# Patient Record
Sex: MALE | Race: BLACK OR AFRICAN AMERICAN | NOT HISPANIC OR LATINO | Employment: FULL TIME | URBAN - METROPOLITAN AREA
[De-identification: names, ages, dates, MRNs, and addresses within clinical notes are randomized per-mention and may not be internally consistent; named-entity substitution may affect disease eponyms.]

---

## 2017-06-16 ENCOUNTER — HOSPITAL ENCOUNTER (EMERGENCY)
Facility: MEDICAL CENTER | Age: 27
End: 2017-06-16
Attending: EMERGENCY MEDICINE
Payer: COMMERCIAL

## 2017-06-16 VITALS
SYSTOLIC BLOOD PRESSURE: 136 MMHG | HEIGHT: 65 IN | BODY MASS INDEX: 23.32 KG/M2 | HEART RATE: 80 BPM | RESPIRATION RATE: 13 BRPM | DIASTOLIC BLOOD PRESSURE: 60 MMHG | WEIGHT: 140 LBS | TEMPERATURE: 98.7 F | OXYGEN SATURATION: 100 %

## 2017-06-16 DIAGNOSIS — K52.9 GASTROENTERITIS: ICD-10-CM

## 2017-06-16 LAB
ANION GAP SERPL CALC-SCNC: 17 MMOL/L (ref 0–11.9)
ANION GAP SERPL CALC-SCNC: 8 MMOL/L (ref 0–11.9)
BUN SERPL-MCNC: 13 MG/DL (ref 8–22)
BUN SERPL-MCNC: 15 MG/DL (ref 8–22)
CALCIUM SERPL-MCNC: 10.6 MG/DL (ref 8.5–10.5)
CALCIUM SERPL-MCNC: 8.4 MG/DL (ref 8.5–10.5)
CHLORIDE SERPL-SCNC: 105 MMOL/L (ref 96–112)
CHLORIDE SERPL-SCNC: 114 MMOL/L (ref 96–112)
CO2 SERPL-SCNC: 18 MMOL/L (ref 20–33)
CO2 SERPL-SCNC: 20 MMOL/L (ref 20–33)
CREAT SERPL-MCNC: 0.92 MG/DL (ref 0.5–1.4)
CREAT SERPL-MCNC: 1.02 MG/DL (ref 0.5–1.4)
GFR SERPL CREATININE-BSD FRML MDRD: >60 ML/MIN/1.73 M 2
GFR SERPL CREATININE-BSD FRML MDRD: >60 ML/MIN/1.73 M 2
GLUCOSE SERPL-MCNC: 156 MG/DL (ref 65–99)
GLUCOSE SERPL-MCNC: 87 MG/DL (ref 65–99)
POTASSIUM SERPL-SCNC: 3.7 MMOL/L (ref 3.6–5.5)
POTASSIUM SERPL-SCNC: 4.3 MMOL/L (ref 3.6–5.5)
SODIUM SERPL-SCNC: 140 MMOL/L (ref 135–145)
SODIUM SERPL-SCNC: 142 MMOL/L (ref 135–145)

## 2017-06-16 PROCEDURE — 36415 COLL VENOUS BLD VENIPUNCTURE: CPT

## 2017-06-16 PROCEDURE — 700105 HCHG RX REV CODE 258: Performed by: EMERGENCY MEDICINE

## 2017-06-16 PROCEDURE — 700111 HCHG RX REV CODE 636 W/ 250 OVERRIDE (IP): Performed by: EMERGENCY MEDICINE

## 2017-06-16 PROCEDURE — 80048 BASIC METABOLIC PNL TOTAL CA: CPT

## 2017-06-16 PROCEDURE — 96374 THER/PROPH/DIAG INJ IV PUSH: CPT

## 2017-06-16 PROCEDURE — 96376 TX/PRO/DX INJ SAME DRUG ADON: CPT

## 2017-06-16 PROCEDURE — A9270 NON-COVERED ITEM OR SERVICE: HCPCS | Performed by: EMERGENCY MEDICINE

## 2017-06-16 PROCEDURE — 700102 HCHG RX REV CODE 250 W/ 637 OVERRIDE(OP): Performed by: EMERGENCY MEDICINE

## 2017-06-16 PROCEDURE — 96372 THER/PROPH/DIAG INJ SC/IM: CPT

## 2017-06-16 PROCEDURE — 99285 EMERGENCY DEPT VISIT HI MDM: CPT

## 2017-06-16 PROCEDURE — 96361 HYDRATE IV INFUSION ADD-ON: CPT

## 2017-06-16 RX ORDER — ONDANSETRON 4 MG/1
4 TABLET, ORALLY DISINTEGRATING ORAL EVERY 8 HOURS PRN
Qty: 10 TAB | Refills: 0 | Status: SHIPPED | OUTPATIENT
Start: 2017-06-16 | End: 2018-01-15

## 2017-06-16 RX ORDER — DICYCLOMINE HYDROCHLORIDE 10 MG/ML
20 INJECTION INTRAMUSCULAR ONCE
Status: COMPLETED | OUTPATIENT
Start: 2017-06-16 | End: 2017-06-16

## 2017-06-16 RX ORDER — LOPERAMIDE HYDROCHLORIDE 2 MG/1
2 CAPSULE ORAL ONCE
Status: COMPLETED | OUTPATIENT
Start: 2017-06-16 | End: 2017-06-16

## 2017-06-16 RX ORDER — LOPERAMIDE HYDROCHLORIDE 2 MG/1
2 CAPSULE ORAL 4 TIMES DAILY PRN
Qty: 12 CAP | Refills: 0 | Status: SHIPPED | OUTPATIENT
Start: 2017-06-16 | End: 2018-01-15

## 2017-06-16 RX ORDER — DICYCLOMINE HYDROCHLORIDE 10 MG/1
10 CAPSULE ORAL 3 TIMES DAILY PRN
Qty: 12 CAP | Refills: 0 | Status: SHIPPED | OUTPATIENT
Start: 2017-06-16 | End: 2018-01-15

## 2017-06-16 RX ORDER — SODIUM CHLORIDE 9 MG/ML
2000 INJECTION, SOLUTION INTRAVENOUS ONCE
Status: COMPLETED | OUTPATIENT
Start: 2017-06-16 | End: 2017-06-16

## 2017-06-16 RX ORDER — ONDANSETRON 2 MG/ML
4 INJECTION INTRAMUSCULAR; INTRAVENOUS ONCE
Status: COMPLETED | OUTPATIENT
Start: 2017-06-16 | End: 2017-06-16

## 2017-06-16 RX ADMIN — LOPERAMIDE HYDROCHLORIDE 2 MG: 2 CAPSULE ORAL at 18:35

## 2017-06-16 RX ADMIN — ONDANSETRON 4 MG: 2 INJECTION INTRAMUSCULAR; INTRAVENOUS at 18:18

## 2017-06-16 RX ADMIN — SODIUM CHLORIDE 2000 ML: 9 INJECTION, SOLUTION INTRAVENOUS at 18:18

## 2017-06-16 RX ADMIN — ONDANSETRON 4 MG: 2 INJECTION INTRAMUSCULAR; INTRAVENOUS at 20:30

## 2017-06-16 RX ADMIN — DICYCLOMINE HYDROCHLORIDE 20 MG: 20 INJECTION, SOLUTION INTRAMUSCULAR at 18:18

## 2017-06-16 ASSESSMENT — LIFESTYLE VARIABLES: DO YOU DRINK ALCOHOL: NO

## 2017-06-16 ASSESSMENT — PAIN SCALES - WONG BAKER: WONGBAKER_NUMERICALRESPONSE: HURTS A WHOLE LOT

## 2017-06-16 NOTE — ED AVS SNAPSHOT
RelayRides Access Code: [email or web address]  Expires: 7/16/2017  8:25 PM    Your email address is not on file at [facility].  Email Addresses are required for you to sign up for RelayRides, please contact [phone] to verify your personal information and to provide your email address prior to attempting to register for RelayRides.    Ethan Saunders Buzz  [address] [place] [address]    RelayRides  A secure, online tool to manage your health information     [facility]’s RelayRides® is a secure, online tool that connects you to your personalized health information from the privacy of your home -- day or night - making it very easy for you to manage your healthcare. Once the activation process is completed, you can even access your medical information using the RelayRides antwan, which is available for free in the Apple Antwan store or Google Play store.     To learn more about RelayRides, visit [email or web address]    There are two levels of access available (as shown below):   My Chart Features  [facility] Primary Care Doctor [facility]  Specialists [facility]  Urgent  Care Non-[facility] Primary Care Doctor   Email your healthcare team securely and privately 24/7 X X X    Manage appointments: schedule your next appointment; view details of past/upcoming appointments X      Request prescription refills. X      View recent personal medical records, including lab and immunizations X X X X   View health record, including health history, allergies, medications X X X X   Read reports about your outpatient visits, procedures, consult and ER notes X X X X   See your discharge summary, which is a recap of your hospital and/or ER visit that includes your diagnosis, lab results, and care plan X X  X     How to register for Comic Replyt:  Once your e-mail address has been verified, follow the following steps to sign up for Comic Replyt.     1. Go to  [email or web address]  2. Click on the Sign Up Now box, which takes you to the New Member Sign Up page. You  will need to provide the following information:  a. Enter your [employer] Access Code exactly as it appears at the top of this page. (You will not need to use this code after you’ve completed the sign-up process. If you do not sign up before the expiration date, you must request a new code.)   b. Enter your date of birth.   c. Enter your home email address.   d. Click Submit, and follow the next screen’s instructions.  3. Create a ithinksportt ID. This will be your [employer] login ID and cannot be changed, so think of one that is secure and easy to remember.  4. Create a [employer] password. You can change your password at any time.  5. Enter your Password Reset Question and Answer. This can be used at a later time if you forget your password.   6. Enter your e-mail address. This allows you to receive e-mail notifications when new information is available in [employer].  7. Click Sign Up. You can now view your health information.    For assistance activating your [employer] account, call [phone]

## 2017-06-16 NOTE — ED AVS SNAPSHOT
Home Care Instructions                                                                                                                Ethan Gerber   MRN: [id number]    Department:  [facility], Emergency Dept   Date of Visit:  6/16/2017            [facility], Emergency Dept    [address]    [place] [address]    Phone:  [phone]      You were seen by     Sunil Strickland M.D.      Your Diagnosis Was     Gastroenteritis     K52.9       These are the medications you received during your hospitalization from 06/16/2017 1742 to 06/16/2017 2025     Date/Time Order Dose Route Action    06/16/2017 1818 NS infusion 2,000 mL 2,000 mL Intravenous New Bag    06/16/2017 1818 ondansetron (ZOFRAN) syringe/vial injection 4 mg 4 mg Intravenous Given    06/16/2017 1818 dicyclomine (BENTYL) injection 20 mg 20 mg Intramuscular Given    06/16/2017 1835 loperamide (IMODIUM) capsule 2 mg 2 mg Oral Given      Follow-up Information     1. Follow up with Roya Royal M.D..    Specialty:  Internal Medicine    Why:  As needed    Contact information    [address]  [facility] [address] [phone]        Medication Information     Review all of your home medications and newly ordered medications with your primary doctor and/or pharmacist as soon as possible. Follow medication instructions as directed by your doctor and/or pharmacist.     Please keep your complete medication list with you and share with your physician. Update the information when medications are discontinued, doses are changed, or new medications (including over-the-counter products) are added; and carry medication information at all times in the event of emergency situations.               Medication List      START taking these medications        Instructions    Morning Afternoon Evening Bedtime    dicyclomine 10 MG Caps   Commonly known as:  BENTYL        Take 1 Cap by mouth 3 times a day as needed (abdominal  cramping).   Dose:  10 mg                        loperamide 2 MG Caps   Last time this was given:  2 mg on 6/16/2017  6:35 PM   Commonly known as:  IMODIUM        Take 1 Cap by mouth 4 times a day as needed for Diarrhea.   Dose:  2 mg                        ondansetron 4 MG Tbdp   Commonly known as:  ZOFRAN ODT        Take 1 Tab by mouth every 8 hours as needed for Nausea/Vomiting.   Dose:  4 mg                          ASK your doctor about these medications        Instructions    Morning Afternoon Evening Bedtime    cetirizine 10 MG Tabs   Commonly known as:  ZYRTEC        Take 10 mg by mouth every day.   Dose:  10 mg                             Where to Get Your Medications      You can get these medications from any pharmacy     Bring a paper prescription for each of these medications    - dicyclomine 10 MG Caps  - loperamide 2 MG Caps  - ondansetron 4 MG Tbdp            Procedures and tests performed during your visit     Procedure/Test Number of Times Performed    BASIC METABOLIC PANEL 2    ESTIMATED GFR 2        Discharge Instructions       Viral Gastroenteritis  Viral gastroenteritis is also known as stomach flu. This condition affects the stomach and intestinal tract. It can cause sudden diarrhea and vomiting. The illness typically lasts 3 to 8 days. Most people develop an immune response that eventually gets rid of the virus. While this natural response develops, the virus can make you quite ill.  CAUSES   Many different viruses can cause gastroenteritis, such as rotavirus or noroviruses. You can catch one of these viruses by consuming contaminated food or water. You may also catch a virus by sharing utensils or other personal items with an infected person or by touching a contaminated surface.  SYMPTOMS   The most common symptoms are diarrhea and vomiting. These problems can cause a severe loss of body fluids (dehydration) and a body salt (electrolyte) imbalance. Other symptoms may  include:  · Fever.  · Headache.  · Fatigue.  · Abdominal pain.  DIAGNOSIS   Your caregiver can usually diagnose viral gastroenteritis based on your symptoms and a physical exam. A stool sample may also be taken to test for the presence of viruses or other infections.  TREATMENT   This illness typically goes away on its own. Treatments are aimed at rehydration. The most serious cases of viral gastroenteritis involve vomiting so severely that you are not able to keep fluids down. In these cases, fluids must be given through an intravenous line (IV).  HOME CARE INSTRUCTIONS   · Drink enough fluids to keep your urine clear or pale yellow. Drink small amounts of fluids frequently and increase the amounts as tolerated.  · Ask your caregiver for specific rehydration instructions.  · Avoid:  ¨ Foods high in sugar.  ¨ Alcohol.  ¨ Carbonated drinks.  ¨ Tobacco.  ¨ Juice.  ¨ Caffeine drinks.  ¨ Extremely hot or cold fluids.  ¨ Fatty, greasy foods.  ¨ Too much intake of anything at one time.  ¨ Dairy products until 24 to 48 hours after diarrhea stops.  · You may consume probiotics. Probiotics are active cultures of beneficial bacteria. They may lessen the amount and number of diarrheal stools in adults. Probiotics can be found in yogurt with active cultures and in supplements.  · Wash your hands well to avoid spreading the virus.  · Only take over-the-counter or prescription medicines for pain, discomfort, or fever as directed by your caregiver. Do not give aspirin to children. Antidiarrheal medicines are not recommended.  · Ask your caregiver if you should continue to take your regular prescribed and over-the-counter medicines.  · Keep all follow-up appointments as directed by your caregiver.  SEEK IMMEDIATE MEDICAL CARE IF:   · You are unable to keep fluids down.  · You do not urinate at least once every 6 to 8 hours.  · You develop shortness of breath.  · You notice blood in your stool or vomit. This may look like coffee  grounds.  · You have abdominal pain that increases or is concentrated in one small area (localized).  · You have persistent vomiting or diarrhea.  · You have a fever.  · The patient is a child younger than 3 months, and he or she has a fever.  · The patient is a child older than 3 months, and he or she has a fever and persistent symptoms.  · The patient is a child older than 3 months, and he or she has a fever and symptoms suddenly get worse.  · The patient is a baby, and he or she has no tears when crying.  MAKE SURE YOU:   · Understand these instructions.  · Will watch your condition.  · Will get help right away if you are not doing well or get worse.     This information is not intended to replace advice given to you by your health care provider. Make sure you discuss any questions you have with your health care provider.     Document Released: 12/18/2006 Document Revised: 03/11/2013 Document Reviewed: 10/03/2012  [employer] Interactive Patient Education ©2016 [employer]            Patient Information     Patient Information    Following emergency treatment: all patient requiring follow-up care must return either to a private physician or a clinic if your condition worsens before you are able to obtain further medical attention, please return to the emergency room.     Billing Information    At [facility], we work to make the billing process streamlined for our patients.  Our Representatives are here to answer any questions you may have regarding your hospital bill.  If you have insurance coverage and have supplied your insurance information to us, we will submit a claim to your insurer on your behalf.  Should you have any questions regarding your bill, we can be reached online or by phone as follows:  Online: You are able pay your bills online or live chat with our representatives about any billing questions you may have. We are here to help Monday - Friday from 8:00am to 7:30pm and 9:00am - 12:00pm on  Saturdays.  Please visit [email or web address]  for more information.   Phone:  [phone] or [phone]    Please note that your emergency physician, surgeon, pathologist, radiologist, anesthesiologist, and other specialists are not employed by [facility] and will therefore bill separately for their services.  Please contact them directly for any questions concerning their bills at the numbers below:     Emergency Physician Services:  [phone]  [facility]:  [phone]  [facility]:  [phone]  [facility]:  [phone]    1. Your final bill may vary from the amount quoted upon discharge if all procedures are not complete at that time, or if your doctor has additional procedures of which we are not aware. You will receive an additional bill if you return to the Emergency Department at [facility] for suture removal regardless of the facility of which the sutures were placed.     2. Please arrange for settlement of this account at the emergency registration.    3. All self-pay accounts are due in full at the time of treatment.  If you are unable to meet this obligation then payment is expected within 4-5 days.     4. If you have had radiology studies (CT, X-ray, Ultrasound, MRI), you have received a preliminary result during your emergency department visit. Please contact the radiology department [phone] to receive a copy of your final result. Please discuss the Final result with your primary physician or with the follow up physician provided.     Crisis Hotline:  [place] Crisis Hotline:  7-489-ZZBICBM or 1-472.209.1983  [place] Crisis Hotline:    1-690.300.7024 or 651-733-3951         ED Discharge Follow Up Questions    1. In order to provide you with very good care, we would like to follow up with a phone call in the next few days.  May we have your permission to contact you?     YES /  NO    2. What is the best  phone number to call you? (       )_____-__________    3. What is the best time to call you?      Morning  /  Afternoon  /  Evening                   Patient Signature:  ____________________________________________________________    Date:  ____________________________________________________________

## 2017-06-16 NOTE — ED AVS SNAPSHOT
6/16/2017    Ethan Gerber  [address]  [place] [address]    Dear Ethan:    [facility] wants to ensure your discharge home is safe and you or your loved ones have had all of your questions answered regarding your care after you leave the hospital.    Below is a list of resources and contact information should you have any questions regarding your hospital stay, follow-up instructions, or active medical symptoms.    Questions or Concerns Regarding… Contact   Medical Questions Related to Your Discharge  (7 days a week, 8am-5pm) Contact a Nurse Care Coordinator   [phone]   Medical Questions Not Related to Your Discharge  (24 hours a day / 7 days a week)  Contact the Nurse Health Line   [phone]    Medications or Discharge Instructions Refer to your discharge packet   or contact your [facility] Primary Care Provider   [phone]   Follow-up Appointment(s) Schedule your appointment via [employer]   or contact Scheduling [phone]   Billing Review your statement via [employer]  or contact Billing [phone]   Medical Records Review your records via [employer]   or contact Medical Records [phone]     You may receive a telephone call within two days of discharge. This call is to make certain you understand your discharge instructions and have the opportunity to have any questions answered. You can also easily access your medical information, test results and upcoming appointments via the [employer] free online health management tool. You can learn more and sign up at [email or web address]. For assistance setting up your [employer] account, please call [phone].    Once again, we want to ensure your discharge home is safe and that you have a clear understanding of any next steps in your care. If you have any questions or concerns, please do not hesitate to contact us, we are here for you. Thank you for choosing [facility] for your healthcare needs.    Sincerely,    Your [facility] Healthcare Team

## 2017-06-17 NOTE — DISCHARGE INSTRUCTIONS
Viral Gastroenteritis  Viral gastroenteritis is also known as stomach flu. This condition affects the stomach and intestinal tract. It can cause sudden diarrhea and vomiting. The illness typically lasts 3 to 8 days. Most people develop an immune response that eventually gets rid of the virus. While this natural response develops, the virus can make you quite ill.  CAUSES   Many different viruses can cause gastroenteritis, such as rotavirus or noroviruses. You can catch one of these viruses by consuming contaminated food or water. You may also catch a virus by sharing utensils or other personal items with an infected person or by touching a contaminated surface.  SYMPTOMS   The most common symptoms are diarrhea and vomiting. These problems can cause a severe loss of body fluids (dehydration) and a body salt (electrolyte) imbalance. Other symptoms may include:  · Fever.  · Headache.  · Fatigue.  · Abdominal pain.  DIAGNOSIS   Your caregiver can usually diagnose viral gastroenteritis based on your symptoms and a physical exam. A stool sample may also be taken to test for the presence of viruses or other infections.  TREATMENT   This illness typically goes away on its own. Treatments are aimed at rehydration. The most serious cases of viral gastroenteritis involve vomiting so severely that you are not able to keep fluids down. In these cases, fluids must be given through an intravenous line (IV).  HOME CARE INSTRUCTIONS   · Drink enough fluids to keep your urine clear or pale yellow. Drink small amounts of fluids frequently and increase the amounts as tolerated.  · Ask your caregiver for specific rehydration instructions.  · Avoid:  ¨ Foods high in sugar.  ¨ Alcohol.  ¨ Carbonated drinks.  ¨ Tobacco.  ¨ Juice.  ¨ Caffeine drinks.  ¨ Extremely hot or cold fluids.  ¨ Fatty, greasy foods.  ¨ Too much intake of anything at one time.  ¨ Dairy products until 24 to 48 hours after diarrhea stops.  · You may consume probiotics.  Probiotics are active cultures of beneficial bacteria. They may lessen the amount and number of diarrheal stools in adults. Probiotics can be found in yogurt with active cultures and in supplements.  · Wash your hands well to avoid spreading the virus.  · Only take over-the-counter or prescription medicines for pain, discomfort, or fever as directed by your caregiver. Do not give aspirin to children. Antidiarrheal medicines are not recommended.  · Ask your caregiver if you should continue to take your regular prescribed and over-the-counter medicines.  · Keep all follow-up appointments as directed by your caregiver.  SEEK IMMEDIATE MEDICAL CARE IF:   · You are unable to keep fluids down.  · You do not urinate at least once every 6 to 8 hours.  · You develop shortness of breath.  · You notice blood in your stool or vomit. This may look like coffee grounds.  · You have abdominal pain that increases or is concentrated in one small area (localized).  · You have persistent vomiting or diarrhea.  · You have a fever.  · The patient is a child younger than 3 months, and he or she has a fever.  · The patient is a child older than 3 months, and he or she has a fever and persistent symptoms.  · The patient is a child older than 3 months, and he or she has a fever and symptoms suddenly get worse.  · The patient is a baby, and he or she has no tears when crying.  MAKE SURE YOU:   · Understand these instructions.  · Will watch your condition.  · Will get help right away if you are not doing well or get worse.     This information is not intended to replace advice given to you by your health care provider. Make sure you discuss any questions you have with your health care provider.     Document Released: 12/18/2006 Document Revised: 03/11/2013 Document Reviewed: 10/03/2012  [employer] Interactive Patient Education ©2016 [employer]

## 2017-06-17 NOTE — ED NOTES
Patient reports feeling much better after medication. Fluid bolus completed. Repeat BMP drawn and sent to lab.

## 2017-06-17 NOTE — ED NOTES
Chief Complaint   Patient presents with   • Nausea/Vomiting/Diarrhea     since this am   • Abdominal Pain     diffuse     BIB [facility] for above. VSS. FSBS 117 per medics. Given phenergen 12.5mg IV PTA. Chart up for ERP.

## 2017-06-17 NOTE — ED NOTES
Remedicated with zofran. Verbalizes understanding of discharge and followup instructions. PIVs removed. VSS. Given Rx's x3. Ambulates with steady gait to discharge with friends.

## 2017-06-17 NOTE — ED PROVIDER NOTES
"ED Provider Note    CHIEF COMPLAINT  Chief Complaint   Patient presents with   • Nausea/Vomiting/Diarrhea     since this am   • Abdominal Pain     diffuse       HPI  Ethan Gerber is a 27 y.o. male who presents to ED with nausea/vomiting/diarrhea. Onset ~0900 today. Unable to quantify # of vomiting episodes. No blood reported. Generalized abdominal cramping. Multiple watery stools w/o blood. +chills. No fevers. No urinary symptoms. Denies medical problems. No friends/family w/ similar symptoms. Phenergan per EMS. BS stable. No drug use.     REVIEW OF SYSTEMS  See HPI for further details. All other systems are negative.     PAST MEDICAL HISTORY   has a past medical history of Kidney stone.    SOCIAL HISTORY  Social History     Social History Main Topics   • Smoking status: Never Smoker    • Smokeless tobacco: Not on file   • Alcohol Use: No   • Drug Use: No   • Sexual Activity:     Partners: Female       SURGICAL HISTORY  patient denies any surgical history    CURRENT MEDICATIONS  Home Medications     Reviewed by Constantine Gillette R.N. (Registered Nurse) on 06/16/17 at 1754  Med List Status: Complete    Medication Last Dose Status    cetirizine (ZYRTEC) 10 MG Tab taking Active                ALLERGIES  No Known Allergies    PHYSICAL EXAM  VITAL SIGNS: /60 mmHg  Pulse 76  Temp(Src) 37.1 °C (98.7 °F)  Resp 14  Ht 1.651 m (5' 5\")  Wt 63.504 kg (140 lb)  BMI 23.30 kg/m2  SpO2 100% @REESE[[id number]::@   Pulse ox interpretation: I interpret this pulse ox as normal.  Constitutional: Alert in no apparent distress.  HENT: No signs of trauma, Bilateral external ears normal, Nose normal.   Eyes: Pupils are equal and reactive, Conjunctiva normal, Non-icteric.   Neck: Normal range of motion, No tenderness, Supple, No stridor. No JVD.  Lymphatic: No lymphadenopathy noted.   Cardiovascular: Regular rate and rhythm, no murmurs.   Thorax & Lungs: Normal breath sounds, No respiratory distress, No wheezing, No chest " tenderness.   Abdomen: Bowel sounds normal, Soft, Mild generalized abdominal tenderness w/o rebound tenderness or guarding, No pulsatile masses. No peritoneal signs.  Skin: Warm, Dry, No erythema, No rash.   Back: No bony tenderness, No CVA tenderness.   Extremities: Intact distal pulses, No edema, No tenderness,   Musculoskeletal: Good range of motion in all major joints. No tenderness to palpation or major deformities noted.   Neurologic: Alert , Normal motor function, Normal sensory function, No focal deficits noted.   Psychiatric: Affect normal, Judgment normal, Mood normal.       DIAGNOSTIC STUDIES / PROCEDURES     LABS  Results for orders placed or performed during the hospital encounter of 06/16/17   BASIC METABOLIC PANEL   Result Value Ref Range    Sodium 140 135 - 145 mmol/L    Potassium 4.3 3.6 - 5.5 mmol/L    Chloride 105 96 - 112 mmol/L    Co2 18 (L) 20 - 33 mmol/L    Glucose 156 (H) 65 - 99 mg/dL    Bun 15 8 - 22 mg/dL    Creatinine 1.02 0.50 - 1.40 mg/dL    Calcium 10.6 (H) 8.5 - 10.5 mg/dL    Anion Gap 17.0 (H) 0.0 - 11.9   ESTIMATED GFR   Result Value Ref Range    GFR If African American >60 >60 mL/min/1.73 m 2    GFR If Non African American >60 >60 mL/min/1.73 m 2   BASIC METABOLIC PANEL   Result Value Ref Range    Sodium 142 135 - 145 mmol/L    Potassium 3.7 3.6 - 5.5 mmol/L    Chloride 114 (H) 96 - 112 mmol/L    Co2 20 20 - 33 mmol/L    Glucose 87 65 - 99 mg/dL    Bun 13 8 - 22 mg/dL    Creatinine 0.92 0.50 - 1.40 mg/dL    Calcium 8.4 (L) 8.5 - 10.5 mg/dL    Anion Gap 8.0 0.0 - 11.9   ESTIMATED GFR   Result Value Ref Range    GFR If African American >60 >60 mL/min/1.73 m 2    GFR If Non African American >60 >60 mL/min/1.73 m 2       COURSE & MEDICAL DECISION MAKING  Pertinent Labs & Imaging studies reviewed. (See chart for details)  Afebrile. Non surgical abdomen. Healthy male. Symptoms c/w gastroenteritis. Initial BMP with AG acidosis. Complete resolution after 2 liters NS and patient very well  appearing tolerating PO. Continue nausea/diarrhea control and urge PO hydration. Left ED well appearing. Strict return instructions provided.     The patient will not drink alcohol nor drive with prescribed medications. The patient will return for worsening symptoms and is stable at the time of discharge. The patient verbalizes understanding and will comply.    FINAL IMPRESSION  1. Gastroenteritis        Electronically signed by: Sunil Strickland, 6/16/2017 6:00 PM

## 2018-01-15 ENCOUNTER — APPOINTMENT (OUTPATIENT)
Dept: RADIOLOGY | Facility: MEDICAL CENTER | Age: 28
End: 2018-01-15
Attending: EMERGENCY MEDICINE
Payer: COMMERCIAL

## 2018-01-15 ENCOUNTER — RESOLUTE PROFESSIONAL BILLING HOSPITAL PROF FEE (OUTPATIENT)
Dept: HOSPITALIST | Facility: MEDICAL CENTER | Age: 28
End: 2018-01-15
Payer: COMMERCIAL

## 2018-01-15 ENCOUNTER — HOSPITAL ENCOUNTER (OUTPATIENT)
Facility: MEDICAL CENTER | Age: 28
End: 2018-01-16
Attending: EMERGENCY MEDICINE | Admitting: INTERNAL MEDICINE
Payer: COMMERCIAL

## 2018-01-15 ENCOUNTER — APPOINTMENT (OUTPATIENT)
Dept: RADIOLOGY | Facility: MEDICAL CENTER | Age: 28
End: 2018-01-15
Attending: INTERNAL MEDICINE
Payer: COMMERCIAL

## 2018-01-15 DIAGNOSIS — R10.9 FLANK PAIN: ICD-10-CM

## 2018-01-15 PROBLEM — N23 RENAL COLIC: Status: ACTIVE | Noted: 2018-01-15

## 2018-01-15 LAB
ALBUMIN SERPL BCP-MCNC: 5 G/DL (ref 3.2–4.9)
ALBUMIN/GLOB SERPL: 1.7 G/DL
ALP SERPL-CCNC: 50 U/L (ref 30–99)
ALT SERPL-CCNC: 29 U/L (ref 2–50)
AMPHET UR QL SCN: NEGATIVE
ANION GAP SERPL CALC-SCNC: 16 MMOL/L (ref 0–11.9)
APPEARANCE UR: CLEAR
APPEARANCE UR: CLEAR
APTT PPP: 23.9 SEC (ref 24.7–36)
AST SERPL-CCNC: 20 U/L (ref 12–45)
BARBITURATES UR QL SCN: NEGATIVE
BASOPHILS # BLD AUTO: 0.6 % (ref 0–1.8)
BASOPHILS # BLD: 0.05 K/UL (ref 0–0.12)
BENZODIAZ UR QL SCN: NEGATIVE
BILIRUB SERPL-MCNC: 0.4 MG/DL (ref 0.1–1.5)
BILIRUB UR QL STRIP.AUTO: NEGATIVE
BILIRUB UR QL STRIP.AUTO: NEGATIVE
BUN SERPL-MCNC: 10 MG/DL (ref 8–22)
BZE UR QL SCN: NEGATIVE
C TRACH DNA SPEC QL NAA+PROBE: NEGATIVE
CALCIUM SERPL-MCNC: 10.2 MG/DL (ref 8.5–10.5)
CANNABINOIDS UR QL SCN: NEGATIVE
CHLORIDE SERPL-SCNC: 103 MMOL/L (ref 96–112)
CO2 SERPL-SCNC: 19 MMOL/L (ref 20–33)
COLOR UR: COLORLESS
COLOR UR: YELLOW
CORTIS SERPL-MCNC: 26.9 UG/DL (ref 0–23)
CREAT SERPL-MCNC: 1.05 MG/DL (ref 0.5–1.4)
CRP SERPL HS-MCNC: 0.16 MG/DL (ref 0–0.75)
EOSINOPHIL # BLD AUTO: 0.33 K/UL (ref 0–0.51)
EOSINOPHIL NFR BLD: 4 % (ref 0–6.9)
ERYTHROCYTE [DISTWIDTH] IN BLOOD BY AUTOMATED COUNT: 38.3 FL (ref 35.9–50)
ERYTHROCYTE [SEDIMENTATION RATE] IN BLOOD BY WESTERGREN METHOD: 0 MM/HOUR (ref 0–15)
GLOBULIN SER CALC-MCNC: 3 G/DL (ref 1.9–3.5)
GLUCOSE BLD-MCNC: 90 MG/DL (ref 65–99)
GLUCOSE SERPL-MCNC: 109 MG/DL (ref 65–99)
GLUCOSE UR STRIP.AUTO-MCNC: NEGATIVE MG/DL
GLUCOSE UR STRIP.AUTO-MCNC: NEGATIVE MG/DL
HCT VFR BLD AUTO: 46.3 % (ref 42–52)
HGB BLD-MCNC: 15.6 G/DL (ref 14–18)
IMM GRANULOCYTES # BLD AUTO: 0.04 K/UL (ref 0–0.11)
IMM GRANULOCYTES NFR BLD AUTO: 0.5 % (ref 0–0.9)
INR PPP: 1.02 (ref 0.87–1.13)
KETONES UR STRIP.AUTO-MCNC: ABNORMAL MG/DL
KETONES UR STRIP.AUTO-MCNC: ABNORMAL MG/DL
LACTATE BLD-SCNC: 1.8 MMOL/L (ref 0.5–2)
LACTATE BLD-SCNC: 1.9 MMOL/L (ref 0.5–2)
LACTATE BLD-SCNC: 3.6 MMOL/L (ref 0.5–2)
LEUKOCYTE ESTERASE UR QL STRIP.AUTO: NEGATIVE
LEUKOCYTE ESTERASE UR QL STRIP.AUTO: NEGATIVE
LIPASE SERPL-CCNC: 11 U/L (ref 11–82)
LYMPHOCYTES # BLD AUTO: 4.01 K/UL (ref 1–4.8)
LYMPHOCYTES NFR BLD: 49 % (ref 22–41)
MAGNESIUM SERPL-MCNC: 1.6 MG/DL (ref 1.5–2.5)
MCH RBC QN AUTO: 27.7 PG (ref 27–33)
MCHC RBC AUTO-ENTMCNC: 33.7 G/DL (ref 33.7–35.3)
MCV RBC AUTO: 82.2 FL (ref 81.4–97.8)
METHADONE UR QL SCN: NEGATIVE
MICRO URNS: ABNORMAL
MICRO URNS: ABNORMAL
MONOCYTES # BLD AUTO: 0.84 K/UL (ref 0–0.85)
MONOCYTES NFR BLD AUTO: 10.3 % (ref 0–13.4)
N GONORRHOEA DNA SPEC QL NAA+PROBE: NEGATIVE
NEUTROPHILS # BLD AUTO: 2.92 K/UL (ref 1.82–7.42)
NEUTROPHILS NFR BLD: 35.6 % (ref 44–72)
NITRITE UR QL STRIP.AUTO: NEGATIVE
NITRITE UR QL STRIP.AUTO: NEGATIVE
NRBC # BLD AUTO: 0 K/UL
NRBC BLD-RTO: 0 /100 WBC
OPIATES UR QL SCN: POSITIVE
OXYCODONE UR QL SCN: POSITIVE
PCP UR QL SCN: NEGATIVE
PH UR STRIP.AUTO: 8 [PH]
PH UR STRIP.AUTO: >=9 [PH]
PHOSPHATE SERPL-MCNC: 1.8 MG/DL (ref 2.5–4.5)
PLATELET # BLD AUTO: 272 K/UL (ref 164–446)
PMV BLD AUTO: 10.8 FL (ref 9–12.9)
POTASSIUM SERPL-SCNC: 3.4 MMOL/L (ref 3.6–5.5)
PROCALCITONIN SERPL-MCNC: <0.05 NG/ML
PROPOXYPH UR QL SCN: NEGATIVE
PROT SERPL-MCNC: 8 G/DL (ref 6–8.2)
PROT UR QL STRIP: NEGATIVE MG/DL
PROT UR QL STRIP: NEGATIVE MG/DL
PROTHROMBIN TIME: 13.1 SEC (ref 12–14.6)
RBC # BLD AUTO: 5.63 M/UL (ref 4.7–6.1)
RBC UR QL AUTO: NEGATIVE
RBC UR QL AUTO: NEGATIVE
SODIUM SERPL-SCNC: 138 MMOL/L (ref 135–145)
SP GR UR STRIP.AUTO: 1.01
SP GR UR STRIP.AUTO: 1.02
SPECIMEN SOURCE: NORMAL
TROPONIN I SERPL-MCNC: <0.01 NG/ML (ref 0–0.04)
TSH SERPL DL<=0.005 MIU/L-ACNC: 2.09 UIU/ML (ref 0.38–5.33)
UROBILINOGEN UR STRIP.AUTO-MCNC: 0.2 MG/DL
UROBILINOGEN UR STRIP.AUTO-MCNC: NORMAL MG/DL
WBC # BLD AUTO: 8.2 K/UL (ref 4.8–10.8)

## 2018-01-15 PROCEDURE — 96367 TX/PROPH/DG ADDL SEQ IV INF: CPT

## 2018-01-15 PROCEDURE — 96366 THER/PROPH/DIAG IV INF ADDON: CPT

## 2018-01-15 PROCEDURE — 85025 COMPLETE CBC W/AUTO DIFF WBC: CPT

## 2018-01-15 PROCEDURE — 51702 INSERT TEMP BLADDER CATH: CPT

## 2018-01-15 PROCEDURE — 78708 K FLOW/FUNCT IMAGE W/DRUG: CPT

## 2018-01-15 PROCEDURE — 85610 PROTHROMBIN TIME: CPT

## 2018-01-15 PROCEDURE — 84100 ASSAY OF PHOSPHORUS: CPT

## 2018-01-15 PROCEDURE — 99285 EMERGENCY DEPT VISIT HI MDM: CPT

## 2018-01-15 PROCEDURE — 76870 US EXAM SCROTUM: CPT

## 2018-01-15 PROCEDURE — G0378 HOSPITAL OBSERVATION PER HR: HCPCS

## 2018-01-15 PROCEDURE — 83735 ASSAY OF MAGNESIUM: CPT

## 2018-01-15 PROCEDURE — 81003 URINALYSIS AUTO W/O SCOPE: CPT

## 2018-01-15 PROCEDURE — 700102 HCHG RX REV CODE 250 W/ 637 OVERRIDE(OP): Performed by: INTERNAL MEDICINE

## 2018-01-15 PROCEDURE — 93005 ELECTROCARDIOGRAM TRACING: CPT | Performed by: INTERNAL MEDICINE

## 2018-01-15 PROCEDURE — 700105 HCHG RX REV CODE 258: Performed by: EMERGENCY MEDICINE

## 2018-01-15 PROCEDURE — 87491 CHLMYD TRACH DNA AMP PROBE: CPT

## 2018-01-15 PROCEDURE — 82962 GLUCOSE BLOOD TEST: CPT

## 2018-01-15 PROCEDURE — 700105 HCHG RX REV CODE 258: Performed by: INTERNAL MEDICINE

## 2018-01-15 PROCEDURE — 85652 RBC SED RATE AUTOMATED: CPT

## 2018-01-15 PROCEDURE — 80307 DRUG TEST PRSMV CHEM ANLYZR: CPT

## 2018-01-15 PROCEDURE — 86140 C-REACTIVE PROTEIN: CPT

## 2018-01-15 PROCEDURE — 96365 THER/PROPH/DIAG IV INF INIT: CPT

## 2018-01-15 PROCEDURE — 303105 HCHG CATHETER EXTRA

## 2018-01-15 PROCEDURE — 85730 THROMBOPLASTIN TIME PARTIAL: CPT

## 2018-01-15 PROCEDURE — 99220 PR INITIAL OBSERVATION CARE,LEVL III: CPT | Performed by: INTERNAL MEDICINE

## 2018-01-15 PROCEDURE — 700111 HCHG RX REV CODE 636 W/ 250 OVERRIDE (IP): Performed by: INTERNAL MEDICINE

## 2018-01-15 PROCEDURE — A9270 NON-COVERED ITEM OR SERVICE: HCPCS | Performed by: INTERNAL MEDICINE

## 2018-01-15 PROCEDURE — 700111 HCHG RX REV CODE 636 W/ 250 OVERRIDE (IP): Performed by: EMERGENCY MEDICINE

## 2018-01-15 PROCEDURE — 87086 URINE CULTURE/COLONY COUNT: CPT

## 2018-01-15 PROCEDURE — 96368 THER/DIAG CONCURRENT INF: CPT

## 2018-01-15 PROCEDURE — 36415 COLL VENOUS BLD VENIPUNCTURE: CPT

## 2018-01-15 PROCEDURE — 83605 ASSAY OF LACTIC ACID: CPT

## 2018-01-15 PROCEDURE — 83690 ASSAY OF LIPASE: CPT

## 2018-01-15 PROCEDURE — 87591 N.GONORRHOEAE DNA AMP PROB: CPT

## 2018-01-15 PROCEDURE — 96376 TX/PRO/DX INJ SAME DRUG ADON: CPT

## 2018-01-15 PROCEDURE — 700111 HCHG RX REV CODE 636 W/ 250 OVERRIDE (IP)

## 2018-01-15 PROCEDURE — 87040 BLOOD CULTURE FOR BACTERIA: CPT

## 2018-01-15 PROCEDURE — 700101 HCHG RX REV CODE 250: Performed by: INTERNAL MEDICINE

## 2018-01-15 PROCEDURE — 84443 ASSAY THYROID STIM HORMONE: CPT

## 2018-01-15 PROCEDURE — 82533 TOTAL CORTISOL: CPT

## 2018-01-15 PROCEDURE — 74176 CT ABD & PELVIS W/O CONTRAST: CPT

## 2018-01-15 PROCEDURE — 84145 PROCALCITONIN (PCT): CPT

## 2018-01-15 PROCEDURE — 80053 COMPREHEN METABOLIC PANEL: CPT

## 2018-01-15 PROCEDURE — 94760 N-INVAS EAR/PLS OXIMETRY 1: CPT

## 2018-01-15 PROCEDURE — 96375 TX/PRO/DX INJ NEW DRUG ADDON: CPT

## 2018-01-15 PROCEDURE — 84484 ASSAY OF TROPONIN QUANT: CPT

## 2018-01-15 RX ORDER — SODIUM CHLORIDE 9 MG/ML
1000 INJECTION, SOLUTION INTRAVENOUS ONCE
Status: COMPLETED | OUTPATIENT
Start: 2018-01-15 | End: 2018-01-15

## 2018-01-15 RX ORDER — HYDROMORPHONE HYDROCHLORIDE 2 MG/ML
INJECTION, SOLUTION INTRAMUSCULAR; INTRAVENOUS; SUBCUTANEOUS
Status: COMPLETED
Start: 2018-01-15 | End: 2018-01-15

## 2018-01-15 RX ORDER — OXYCODONE HYDROCHLORIDE 5 MG/1
5 TABLET ORAL
Status: DISCONTINUED | OUTPATIENT
Start: 2018-01-15 | End: 2018-01-16 | Stop reason: HOSPADM

## 2018-01-15 RX ORDER — SODIUM CHLORIDE 9 MG/ML
500 INJECTION, SOLUTION INTRAVENOUS
Status: DISCONTINUED | OUTPATIENT
Start: 2018-01-15 | End: 2018-01-16 | Stop reason: HOSPADM

## 2018-01-15 RX ORDER — HYDROMORPHONE HYDROCHLORIDE 2 MG/ML
1 INJECTION, SOLUTION INTRAMUSCULAR; INTRAVENOUS; SUBCUTANEOUS ONCE
Status: COMPLETED | OUTPATIENT
Start: 2018-01-15 | End: 2018-01-15

## 2018-01-15 RX ORDER — ACETAMINOPHEN 325 MG/1
650 TABLET ORAL EVERY 6 HOURS PRN
Status: DISCONTINUED | OUTPATIENT
Start: 2018-01-15 | End: 2018-01-16 | Stop reason: HOSPADM

## 2018-01-15 RX ORDER — LABETALOL HYDROCHLORIDE 5 MG/ML
10 INJECTION, SOLUTION INTRAVENOUS EVERY 4 HOURS PRN
Status: DISCONTINUED | OUTPATIENT
Start: 2018-01-15 | End: 2018-01-16 | Stop reason: HOSPADM

## 2018-01-15 RX ORDER — AMOXICILLIN 250 MG
2 CAPSULE ORAL 2 TIMES DAILY
Status: DISCONTINUED | OUTPATIENT
Start: 2018-01-15 | End: 2018-01-16 | Stop reason: HOSPADM

## 2018-01-15 RX ORDER — PROMETHAZINE HYDROCHLORIDE 25 MG/1
12.5-25 SUPPOSITORY RECTAL EVERY 4 HOURS PRN
Status: DISCONTINUED | OUTPATIENT
Start: 2018-01-15 | End: 2018-01-16 | Stop reason: HOSPADM

## 2018-01-15 RX ORDER — POTASSIUM CHLORIDE 7.45 MG/ML
10 INJECTION INTRAVENOUS
Status: COMPLETED | OUTPATIENT
Start: 2018-01-15 | End: 2018-01-15

## 2018-01-15 RX ORDER — PROMETHAZINE HYDROCHLORIDE 25 MG/1
12.5-25 TABLET ORAL EVERY 4 HOURS PRN
Status: DISCONTINUED | OUTPATIENT
Start: 2018-01-15 | End: 2018-01-16 | Stop reason: HOSPADM

## 2018-01-15 RX ORDER — METOCLOPRAMIDE HYDROCHLORIDE 5 MG/ML
10 INJECTION INTRAMUSCULAR; INTRAVENOUS EVERY 6 HOURS PRN
Status: DISCONTINUED | OUTPATIENT
Start: 2018-01-15 | End: 2018-01-15

## 2018-01-15 RX ORDER — ONDANSETRON 2 MG/ML
4 INJECTION INTRAMUSCULAR; INTRAVENOUS ONCE
Status: COMPLETED | OUTPATIENT
Start: 2018-01-15 | End: 2018-01-15

## 2018-01-15 RX ORDER — FUROSEMIDE 10 MG/ML
INJECTION INTRAMUSCULAR; INTRAVENOUS
Status: COMPLETED
Start: 2018-01-15 | End: 2018-01-15

## 2018-01-15 RX ORDER — TAMSULOSIN HYDROCHLORIDE 0.4 MG/1
0.4 CAPSULE ORAL
Status: DISCONTINUED | OUTPATIENT
Start: 2018-01-15 | End: 2018-01-16 | Stop reason: HOSPADM

## 2018-01-15 RX ORDER — POLYETHYLENE GLYCOL 3350 17 G/17G
1 POWDER, FOR SOLUTION ORAL
Status: DISCONTINUED | OUTPATIENT
Start: 2018-01-15 | End: 2018-01-16 | Stop reason: HOSPADM

## 2018-01-15 RX ORDER — ONDANSETRON 4 MG/1
4 TABLET, ORALLY DISINTEGRATING ORAL EVERY 4 HOURS PRN
Status: DISCONTINUED | OUTPATIENT
Start: 2018-01-15 | End: 2018-01-16 | Stop reason: HOSPADM

## 2018-01-15 RX ORDER — MAGNESIUM SULFATE HEPTAHYDRATE 40 MG/ML
2 INJECTION, SOLUTION INTRAVENOUS ONCE
Status: COMPLETED | OUTPATIENT
Start: 2018-01-15 | End: 2018-01-15

## 2018-01-15 RX ORDER — M-VIT,TX,IRON,MINS/CALC/FOLIC 27MG-0.4MG
1 TABLET ORAL DAILY
COMMUNITY
End: 2019-01-09

## 2018-01-15 RX ORDER — MORPHINE SULFATE 4 MG/ML
4 INJECTION, SOLUTION INTRAMUSCULAR; INTRAVENOUS ONCE
Status: COMPLETED | OUTPATIENT
Start: 2018-01-15 | End: 2018-01-15

## 2018-01-15 RX ORDER — DEXTROSE MONOHYDRATE, SODIUM CHLORIDE, AND POTASSIUM CHLORIDE 50; 2.98; 4.5 G/1000ML; G/1000ML; G/1000ML
INJECTION, SOLUTION INTRAVENOUS CONTINUOUS
Status: DISCONTINUED | OUTPATIENT
Start: 2018-01-15 | End: 2018-01-16

## 2018-01-15 RX ORDER — KETOROLAC TROMETHAMINE 30 MG/ML
30 INJECTION, SOLUTION INTRAMUSCULAR; INTRAVENOUS ONCE
Status: COMPLETED | OUTPATIENT
Start: 2018-01-15 | End: 2018-01-15

## 2018-01-15 RX ORDER — ONDANSETRON 2 MG/ML
4-8 INJECTION INTRAMUSCULAR; INTRAVENOUS EVERY 4 HOURS PRN
Status: DISCONTINUED | OUTPATIENT
Start: 2018-01-15 | End: 2018-01-15

## 2018-01-15 RX ORDER — SODIUM CHLORIDE 9 MG/ML
1500 INJECTION, SOLUTION INTRAVENOUS ONCE
Status: COMPLETED | OUTPATIENT
Start: 2018-01-15 | End: 2018-01-15

## 2018-01-15 RX ORDER — BISACODYL 10 MG
10 SUPPOSITORY, RECTAL RECTAL
Status: DISCONTINUED | OUTPATIENT
Start: 2018-01-15 | End: 2018-01-16 | Stop reason: HOSPADM

## 2018-01-15 RX ORDER — HYDROMORPHONE HYDROCHLORIDE 2 MG/ML
1 INJECTION, SOLUTION INTRAMUSCULAR; INTRAVENOUS; SUBCUTANEOUS EVERY 4 HOURS PRN
Status: DISCONTINUED | OUTPATIENT
Start: 2018-01-15 | End: 2018-01-15

## 2018-01-15 RX ORDER — OXYCODONE HYDROCHLORIDE 5 MG/1
10 TABLET ORAL
Status: DISCONTINUED | OUTPATIENT
Start: 2018-01-15 | End: 2018-01-16 | Stop reason: HOSPADM

## 2018-01-15 RX ORDER — HYDROMORPHONE HYDROCHLORIDE 2 MG/ML
0.5 INJECTION, SOLUTION INTRAMUSCULAR; INTRAVENOUS; SUBCUTANEOUS
Status: DISCONTINUED | OUTPATIENT
Start: 2018-01-15 | End: 2018-01-16 | Stop reason: HOSPADM

## 2018-01-15 RX ORDER — ONDANSETRON 2 MG/ML
4 INJECTION INTRAMUSCULAR; INTRAVENOUS EVERY 4 HOURS PRN
Status: DISCONTINUED | OUTPATIENT
Start: 2018-01-15 | End: 2018-01-16 | Stop reason: HOSPADM

## 2018-01-15 RX ADMIN — ONDANSETRON 4 MG: 2 INJECTION INTRAMUSCULAR; INTRAVENOUS at 19:27

## 2018-01-15 RX ADMIN — ONDANSETRON 4 MG: 4 TABLET, ORALLY DISINTEGRATING ORAL at 13:54

## 2018-01-15 RX ADMIN — CEFTRIAXONE 2 G: 2 INJECTION, POWDER, FOR SOLUTION INTRAMUSCULAR; INTRAVENOUS at 12:52

## 2018-01-15 RX ADMIN — ONDANSETRON 4 MG: 2 INJECTION INTRAMUSCULAR; INTRAVENOUS at 12:44

## 2018-01-15 RX ADMIN — POTASSIUM PHOSPHATE, MONOBASIC AND POTASSIUM PHOSPHATE, DIBASIC 30 MMOL: 224; 236 INJECTION, SOLUTION INTRAVENOUS at 19:26

## 2018-01-15 RX ADMIN — SODIUM CHLORIDE 1500 ML: 9 INJECTION, SOLUTION INTRAVENOUS at 11:45

## 2018-01-15 RX ADMIN — ONDANSETRON 4 MG: 2 INJECTION INTRAMUSCULAR; INTRAVENOUS at 06:28

## 2018-01-15 RX ADMIN — MAGNESIUM SULFATE IN WATER 2 G: 40 INJECTION, SOLUTION INTRAVENOUS at 16:59

## 2018-01-15 RX ADMIN — POTASSIUM CHLORIDE 10 MEQ: 7.46 INJECTION, SOLUTION INTRAVENOUS at 12:52

## 2018-01-15 RX ADMIN — SODIUM CHLORIDE 1000 ML: 9 INJECTION, SOLUTION INTRAVENOUS at 10:59

## 2018-01-15 RX ADMIN — HYDROMORPHONE HYDROCHLORIDE 1 MG: 2 INJECTION INTRAMUSCULAR; INTRAVENOUS; SUBCUTANEOUS at 09:41

## 2018-01-15 RX ADMIN — HYDROMORPHONE HYDROCHLORIDE 1 MG: 2 INJECTION INTRAMUSCULAR; INTRAVENOUS; SUBCUTANEOUS at 07:12

## 2018-01-15 RX ADMIN — HYDROMORPHONE HYDROCHLORIDE 1 MG: 2 INJECTION INTRAMUSCULAR; INTRAVENOUS; SUBCUTANEOUS at 12:45

## 2018-01-15 RX ADMIN — TAMSULOSIN HYDROCHLORIDE 0.4 MG: 0.4 CAPSULE ORAL at 12:00

## 2018-01-15 RX ADMIN — ONDANSETRON 4 MG: 2 INJECTION INTRAMUSCULAR; INTRAVENOUS at 10:59

## 2018-01-15 RX ADMIN — HYDROMORPHONE HYDROCHLORIDE 1 MG: 2 INJECTION INTRAMUSCULAR; INTRAVENOUS; SUBCUTANEOUS at 13:53

## 2018-01-15 RX ADMIN — POTASSIUM CHLORIDE, DEXTROSE MONOHYDRATE AND SODIUM CHLORIDE: 300; 5; 450 INJECTION, SOLUTION INTRAVENOUS at 16:59

## 2018-01-15 RX ADMIN — MORPHINE SULFATE 4 MG: 4 INJECTION INTRAVENOUS at 06:28

## 2018-01-15 RX ADMIN — KETOROLAC TROMETHAMINE 30 MG: 30 INJECTION, SOLUTION INTRAMUSCULAR at 06:34

## 2018-01-15 RX ADMIN — POTASSIUM CHLORIDE 10 MEQ: 7.46 INJECTION, SOLUTION INTRAVENOUS at 12:03

## 2018-01-15 RX ADMIN — PROCHLORPERAZINE EDISYLATE 10 MG: 5 INJECTION INTRAMUSCULAR; INTRAVENOUS at 11:58

## 2018-01-15 RX ADMIN — FUROSEMIDE 20 MG: 10 INJECTION, SOLUTION INTRAMUSCULAR; INTRAVENOUS at 15:15

## 2018-01-15 ASSESSMENT — ENCOUNTER SYMPTOMS
HEADACHES: 0
SEIZURES: 0
SENSORY CHANGE: 0
ABDOMINAL PAIN: 1
BLURRED VISION: 0
SPUTUM PRODUCTION: 0
NECK PAIN: 0
NAUSEA: 1
CLAUDICATION: 0
DEPRESSION: 0
HEARTBURN: 0
WHEEZING: 0
DIARRHEA: 0
ORTHOPNEA: 0
TREMORS: 0
MYALGIAS: 0
COUGH: 0
FOCAL WEAKNESS: 0
DOUBLE VISION: 0
CHILLS: 1
FALLS: 0
SHORTNESS OF BREATH: 0
LOSS OF CONSCIOUSNESS: 0
PALPITATIONS: 0
TINGLING: 0
WEAKNESS: 1
SPEECH CHANGE: 0
DIZZINESS: 0
PND: 0
CONSTIPATION: 0
BLOOD IN STOOL: 0
FEVER: 0
FLANK PAIN: 1
HEMOPTYSIS: 0
BACK PAIN: 0
DIAPHORESIS: 1
VOMITING: 1

## 2018-01-15 ASSESSMENT — LIFESTYLE VARIABLES
ALCOHOL_USE: NO
EVER_SMOKED: NEVER
EVER_SMOKED: NEVER
DO YOU DRINK ALCOHOL: NO

## 2018-01-15 ASSESSMENT — PAIN SCALES - GENERAL
PAINLEVEL_OUTOF10: 3
PAINLEVEL_OUTOF10: 0
PAINLEVEL_OUTOF10: 3
PAINLEVEL_OUTOF10: 8
PAINLEVEL_OUTOF10: 10
PAINLEVEL_OUTOF10: 10

## 2018-01-15 ASSESSMENT — PATIENT HEALTH QUESTIONNAIRE - PHQ9
SUM OF ALL RESPONSES TO PHQ QUESTIONS 1-9: 0
1. LITTLE INTEREST OR PLEASURE IN DOING THINGS: NOT AT ALL
2. FEELING DOWN, DEPRESSED, IRRITABLE, OR HOPELESS: NOT AT ALL
SUM OF ALL RESPONSES TO PHQ9 QUESTIONS 1 AND 2: 0

## 2018-01-15 ASSESSMENT — COPD QUESTIONNAIRES
DO YOU EVER COUGH UP ANY MUCUS OR PHLEGM?: NO/ONLY WITH OCCASIONAL COLDS OR INFECTIONS
DURING THE PAST 4 WEEKS HOW MUCH DID YOU FEEL SHORT OF BREATH: NONE/LITTLE OF THE TIME
HAVE YOU SMOKED AT LEAST 100 CIGARETTES IN YOUR ENTIRE LIFE: NO/DON'T KNOW

## 2018-01-15 NOTE — PROGRESS NOTES
Report received, assumed patient care.  Pt A&OX4.  Family at bedside.  Assessment completed.  Call light within reach, personal belongings available, bed in lowest position, pt calling for assistance.  Pt reports pain to tip of penis, he believes it is d/t the feng catheter, wants it removed.  Dr. Bui needs the feng in until the nucmed study is complete (study will be done at 1420).  Pt aware and agreeable to keep feng in until study is complete.  Offered ice and pain medication.  Pt refused ice however is agreeable to pain meds, see MAR.  +n, +v, see MAR.  Pt is NPO.  Communication board updated, POC discussed.  VSS.  No additional needs at this time.

## 2018-01-15 NOTE — ED NOTES
Pt. Unable to urinate and feels bladder pressure... Dueñas Catheter inserted.... Pt. Tolerates well.... aao x 4

## 2018-01-15 NOTE — ED NOTES
"Med rec updated and complete  Allergies reviewed  Pt states \"No antibiotics in the last 30 days\".  Pt states \"No prescription medications\".    "

## 2018-01-15 NOTE — ED PROVIDER NOTES
"ED Provider Note    CHIEF COMPLAINT  Chief Complaint   Patient presents with   • Flank Pain     L side; radiates to genital area       HPI  Ethan Gerber is a 27 y.o. male who presents with left flank pain, return to the yesterday, much worse this morning. Urinary urgency last night, nausea. Then this morning, sudden onset severe left flank pain radiating to his left testicle and left lower quadrant. Nausea without vomiting. No severe sudden onset of colicky pain no fever no chills no hematuria. History of kidney stones with similar symptoms    REVIEW OF SYSTEMS  See HPI for further details history of kidney stones. Denies other G.I., G.U.. endrocine, cardiovascular, respriatory or neurological problems. All other systems are negative.     PAST MEDICAL HISTORY  Past Medical History:   Diagnosis Date   • Kidney stone        FAMILY HISTORY  Family History   Problem Relation Age of Onset   • Cancer Mother 44     lung cancer/ smoker       SOCIAL HISTORY he is a nonsmoker  Social History     Social History   • Marital status:      Spouse name: N/A   • Number of children: N/A   • Years of education: N/A     Social History Main Topics   • Smoking status: Never Smoker   • Smokeless tobacco: Not on file   • Alcohol use No   • Drug use: No   • Sexual activity: Yes     Partners: Female     Other Topics Concern   • Not on file     Social History Narrative   • No narrative on file       SURGICAL HISTORY  No past surgical history on file.    CURRENT MEDICATIONS  Home Medications    **Home medications have not yet been reviewed for this encounter**         ALLERGIES  No Known Allergies    PHYSICAL EXAM  VITAL SIGNS: /80   Pulse (!) 104   Temp 36.7 °C (98 °F)   Resp (!) 24   Ht 1.727 m (5' 8\")   Wt 80.5 kg (177 lb 7.5 oz)   SpO2 100%   BMI 26.98 kg/m²   Constitutional: Well developed, Well nourished, appears to be in acute pain  HENT: Normocephalic, Atraumatic, Bilateral external ears normal, Oropharynx " moist, No oral exudates, Nose normal.   Eyes: PERRL, EOMI, Conjunctiva normal, No discharge.   Neck: Normal range of motion, No tenderness, Supple, No stridor.   Lymphatic: No lymphadenopathy noted.   Cardiovascular: Normal heart rate, Normal rhythm, No murmurs, No rubs, No gallops.   Thorax & Lungs: Normal breath sounds, No respiratory distress, No wheezing, No chest tenderness.   Abdomen:  No tenderness, no guarding no rigidity and the abdomen is soft.  No masses, No pulsatile masses.  Skin: Warm, Dry, No erythema, No rash.   Back: No tenderness, No CVA tenderness.   Extremities: Intact distal pulses, No edema, No tenderness, No cyanosis, No clubbing.   Musculoskeletal: Good range of motion in all major joints. No tenderness to palpation or major deformities noted.   Neurologic: Alert & oriented x 3, Normal motor function, Normal sensory function, No focal deficits noted.   Psychiatric: Affect normal, Judgment normal, Mood normal. Siaety about his pain  Penis and scrotum, nontender. Testicle nontender    RADIOLOGY/PROCEDURES  CT-RENAL COLIC EVALUATION(A/P W/O)   Final Result         1.  No hydronephrosis or nephrolithiasis.      2.  Minimal dilatation of the proximal left ureter. No left ureteral stone identified.      3.  No distal ureteral stone identified. Multiple left-sided pelvic phleboliths are present      4.  Normal appearance of the appendix.            COURSE & MEDICAL DECISION MAKING  Pertinent Labs & Imaging studies reviewed. (See chart for details)  Urinalysis, negative for blood negative for infection      He has sudden onset of flank pain, history of kidney stones, given Toradol morphine and Zofran CAT scan demonstrates minimal dilation of the proximal left ureter.. There was no obstruction was identified.  . I have talked with the on-call urologist Dr. Vanessa recommended a magnetic 3 renal scan with Lasix. That has been offered. Despite the paucity of findings on imaging he is still having a great  deal of pain. I have talked with the hospitalist about admission  FINAL IMPRESSION  1.   1. Flank pain        2.   3.     Disposition  Hospitalist to admit, Dr. Amrit Vanessa, neurology consult  Electronically signed by: Kael Ricardo, 1/15/2018 6:26 AM

## 2018-01-15 NOTE — ED NOTES
"Ethan Gerber  Chief Complaint   Patient presents with   • Flank Pain     L side; radiates to genital area       Pt ambulatory to triage with above complaint. Pt states pain started yesterday around 1000. Pain has increased in severity, 10/10. Pt states he has hx of kidney stones and s/s are similar to past. Pt states he took percocet yesterday and today with little relief. Pt report difficulty with urination despite drinking copious amounts of fluid.     /80   Pulse (!) 104   Temp 36.7 °C (98 °F)   Resp (!) 24   Ht 1.727 m (5' 8\")   Wt 80.5 kg (177 lb 7.5 oz)   SpO2 100%   BMI 26.98 kg/m²     Pt informed of triage process and encouraged to notify staff of any changes or concerns. Pt verbalized understanding of instructions. Apologized for long wait time. Pt placed back in lobby.     "

## 2018-01-16 VITALS
TEMPERATURE: 98.6 F | RESPIRATION RATE: 20 BRPM | HEIGHT: 68 IN | HEART RATE: 84 BPM | BODY MASS INDEX: 26.9 KG/M2 | OXYGEN SATURATION: 96 % | DIASTOLIC BLOOD PRESSURE: 58 MMHG | WEIGHT: 177.47 LBS | SYSTOLIC BLOOD PRESSURE: 119 MMHG

## 2018-01-16 PROBLEM — N23 RENAL COLIC: Status: RESOLVED | Noted: 2018-01-15 | Resolved: 2018-01-16

## 2018-01-16 LAB
ALBUMIN SERPL BCP-MCNC: 4.5 G/DL (ref 3.2–4.9)
ALBUMIN/GLOB SERPL: 1.6 G/DL
ALP SERPL-CCNC: 41 U/L (ref 30–99)
ALT SERPL-CCNC: 20 U/L (ref 2–50)
ANION GAP SERPL CALC-SCNC: 8 MMOL/L (ref 0–11.9)
AST SERPL-CCNC: 16 U/L (ref 12–45)
BASOPHILS # BLD AUTO: 0.4 % (ref 0–1.8)
BASOPHILS # BLD: 0.03 K/UL (ref 0–0.12)
BILIRUB SERPL-MCNC: 0.5 MG/DL (ref 0.1–1.5)
BUN SERPL-MCNC: 8 MG/DL (ref 8–22)
CALCIUM SERPL-MCNC: 9 MG/DL (ref 8.5–10.5)
CHLORIDE SERPL-SCNC: 103 MMOL/L (ref 96–112)
CO2 SERPL-SCNC: 26 MMOL/L (ref 20–33)
CREAT SERPL-MCNC: 0.96 MG/DL (ref 0.5–1.4)
EOSINOPHIL # BLD AUTO: 0.07 K/UL (ref 0–0.51)
EOSINOPHIL NFR BLD: 1 % (ref 0–6.9)
ERYTHROCYTE [DISTWIDTH] IN BLOOD BY AUTOMATED COUNT: 38.2 FL (ref 35.9–50)
GLOBULIN SER CALC-MCNC: 2.8 G/DL (ref 1.9–3.5)
GLUCOSE SERPL-MCNC: 96 MG/DL (ref 65–99)
HCT VFR BLD AUTO: 40.4 % (ref 42–52)
HGB BLD-MCNC: 14 G/DL (ref 14–18)
IMM GRANULOCYTES # BLD AUTO: 0.03 K/UL (ref 0–0.11)
IMM GRANULOCYTES NFR BLD AUTO: 0.4 % (ref 0–0.9)
LYMPHOCYTES # BLD AUTO: 1.66 K/UL (ref 1–4.8)
LYMPHOCYTES NFR BLD: 22.9 % (ref 22–41)
MAGNESIUM SERPL-MCNC: 2.4 MG/DL (ref 1.5–2.5)
MCH RBC QN AUTO: 28.5 PG (ref 27–33)
MCHC RBC AUTO-ENTMCNC: 34.7 G/DL (ref 33.7–35.3)
MCV RBC AUTO: 82.1 FL (ref 81.4–97.8)
MONOCYTES # BLD AUTO: 0.97 K/UL (ref 0–0.85)
MONOCYTES NFR BLD AUTO: 13.4 % (ref 0–13.4)
NEUTROPHILS # BLD AUTO: 4.48 K/UL (ref 1.82–7.42)
NEUTROPHILS NFR BLD: 61.9 % (ref 44–72)
NRBC # BLD AUTO: 0 K/UL
NRBC BLD-RTO: 0 /100 WBC
PHOSPHATE SERPL-MCNC: 4.2 MG/DL (ref 2.5–4.5)
PLATELET # BLD AUTO: 218 K/UL (ref 164–446)
PMV BLD AUTO: 10.1 FL (ref 9–12.9)
POTASSIUM SERPL-SCNC: 3.9 MMOL/L (ref 3.6–5.5)
PROT SERPL-MCNC: 7.3 G/DL (ref 6–8.2)
RBC # BLD AUTO: 4.92 M/UL (ref 4.7–6.1)
SODIUM SERPL-SCNC: 137 MMOL/L (ref 135–145)
WBC # BLD AUTO: 7.2 K/UL (ref 4.8–10.8)

## 2018-01-16 PROCEDURE — 84100 ASSAY OF PHOSPHORUS: CPT

## 2018-01-16 PROCEDURE — 96366 THER/PROPH/DIAG IV INF ADDON: CPT

## 2018-01-16 PROCEDURE — 80053 COMPREHEN METABOLIC PANEL: CPT

## 2018-01-16 PROCEDURE — G0378 HOSPITAL OBSERVATION PER HR: HCPCS

## 2018-01-16 PROCEDURE — 85025 COMPLETE CBC W/AUTO DIFF WBC: CPT

## 2018-01-16 PROCEDURE — 83735 ASSAY OF MAGNESIUM: CPT

## 2018-01-16 PROCEDURE — 36415 COLL VENOUS BLD VENIPUNCTURE: CPT

## 2018-01-16 PROCEDURE — 99217 PR OBSERVATION CARE DISCHARGE: CPT | Performed by: HOSPITALIST

## 2018-01-16 ASSESSMENT — PAIN SCALES - GENERAL
PAINLEVEL_OUTOF10: 0
PAINLEVEL_OUTOF10: 0

## 2018-01-16 NOTE — PROGRESS NOTES
RN to RN Bedside report dose, assumed care for pt. Assessment completed at this time, Pt c/o nausea and being hot. Pt medicated per MAR, environmental service called for temp in room.

## 2018-01-16 NOTE — H&P
Hospital Medicine History and Physical    Date of Service  1/15/2018    Chief Complaint  Chief Complaint   Patient presents with   • Flank Pain     L side; radiates to genital area       History of Presenting Illness  27 y.o. male who presented 1/15/2018 with left-sided flank pain. Patient reports history of prior kidney stones since the age of 17. Reports he started having left flank pain yesterday morning around 10:00 in the morning. Reports that it has been subsequently progressive and worsening. He knew that he was having another episode of renal colic. He drank 4-5 L of fluid yesterday. His pain persisted into today and he now presents to the emergency room for further evaluation. Reports the pain to be located in the left flank pain earlier but now progressing towards the left groin area. It is sharp/colicky in character, 9-10 out of 10 in intensity. Intermittent severe colics. Period in towards his groin/testicular area now. No improving or worsening factors of this pain. Now he has more suprapubic pain and urethral pain. Surprisingly denies any hematuria, dysuria. There is frequency and urgency. Denies any fevers or chills. Has had nausea and vomiting. Profuse vomiting, multiple episodes with this pain and no hematemesis. No fever complaints of chills. No chest pain, shortness of breath. Slight diarrhea yesterday. No lower extremity swelling. Reports took a one-time dose of Percocet at home yesterday. And before sleeping took some Tylenol. Woke up multiple times in the night to urinate. Intractable nausea and presented to the emergency room. During my evaluation patient in intractable pain and having ongoing vomiting. CT of the abdomen obtained on presentation does not reveal any acute stones. Emergency room physician discussed the case with urology team who recommended a nuclear diuretic washout study. Patient at this time will be admitted to the hospital for observation and further evaluation requested.     Primary Care Physician  Roya Royal M.D.    Consultants  None    Code Status  FULL CODE    Review of Systems  Review of Systems   Constitutional: Positive for chills, diaphoresis and malaise/fatigue. Negative for fever.   HENT: Negative for hearing loss and tinnitus.    Eyes: Negative for blurred vision and double vision.   Respiratory: Negative for cough, hemoptysis, sputum production, shortness of breath and wheezing.    Cardiovascular: Negative for chest pain, palpitations, orthopnea, claudication, leg swelling and PND.   Gastrointestinal: Positive for abdominal pain, nausea and vomiting. Negative for blood in stool, constipation, diarrhea, heartburn and melena.   Genitourinary: Positive for flank pain, frequency and urgency. Negative for dysuria and hematuria.   Musculoskeletal: Negative for back pain, falls, joint pain, myalgias and neck pain.   Skin: Negative for itching and rash.   Neurological: Positive for weakness. Negative for dizziness, tingling, tremors, sensory change, speech change, focal weakness, seizures, loss of consciousness and headaches.   Psychiatric/Behavioral: Negative for depression and suicidal ideas.        Past Medical History  Past Medical History:   Diagnosis Date   • Kidney stone        Surgical History  No prior surgeries per patient    Medications  No current facility-administered medications on file prior to encounter.      Current Outpatient Prescriptions on File Prior to Encounter   Medication Sig Dispense Refill   • cetirizine (ZYRTEC) 10 MG Tab Take 10 mg by mouth every day.         Family History  Family History   Problem Relation Age of Onset   • Cancer Mother 44     lung cancer/ smoker       Social History  Social History   Substance Use Topics   • Smoking status: Never Smoker   • Smokeless tobacco: Never Used   • Alcohol use No       Allergies  No Known Allergies     Physical Exam  Laboratory   Hemodynamics  Temp (24hrs), Av.8 °C (98.3 °F), Min:36.7 °C (98 °F),  Max:37 °C (98.6 °F)   Temperature: 36.9 °C (98.4 °F)  Pulse  Av.3  Min: 75  Max: 104    Blood Pressure: 118/58      Respiratory      Respiration: 16, Pulse Oximetry: 98 %, O2 Daily Delivery Respiratory : Room Air with O2 Available             Physical Exam   Constitutional: He is oriented to person, place, and time. He appears well-developed and well-nourished. No distress.   HENT:   Head: Normocephalic.   Mouth/Throat: Oropharynx is clear and moist. No oropharyngeal exudate.   Eyes: Conjunctivae and EOM are normal. Pupils are equal, round, and reactive to light. No scleral icterus.   Neck: No JVD present. No thyromegaly present.   Cardiovascular: Normal rate, regular rhythm and normal heart sounds.  Exam reveals no gallop and no friction rub.    No murmur heard.  Pulmonary/Chest: No stridor. No respiratory distress. He has no wheezes. He has no rales.   Abdominal: Soft. Bowel sounds are normal. He exhibits no distension. There is no tenderness. There is no rebound.   Positive Left renal punch. Minimal suprapubic tenderness. There are no peritoneal signs. NO RUQ tenderness. BS positive. No signs to suggest diverticulitis or appendicitis.    Genitourinary:   Genitourinary Comments: Testicular exam reveals no tenderness. No urethral discharge. Testes normal in size. No swelling. No masses palpable.    Musculoskeletal: He exhibits no edema, tenderness or deformity.   Neurological: He is alert and oriented to person, place, and time. He has normal reflexes. No cranial nerve deficit.   Skin: Skin is warm and dry. He is not diaphoretic.   Psychiatric: He has a normal mood and affect. His behavior is normal. Judgment and thought content normal.       Recent Labs      01/15/18   0635   WBC  8.2   RBC  5.63   HEMOGLOBIN  15.6   HEMATOCRIT  46.3   MCV  82.2   MCH  27.7   MCHC  33.7   RDW  38.3   PLATELETCT  272   MPV  10.8     Recent Labs      01/15/18   0635   SODIUM  138   POTASSIUM  3.4*   CHLORIDE  103   CO2  19*  "  GLUCOSE  109*   BUN  10   CREATININE  1.05   CALCIUM  10.2     Recent Labs      01/15/18   0635   ALTSGPT  29   ASTSGOT  20   ALKPHOSPHAT  50   TBILIRUBIN  0.4   LIPASE  11   GLUCOSE  109*     Recent Labs      01/15/18   0955   APTT  23.9*   INR  1.02             Lab Results   Component Value Date    TROPONINI <0.01 01/15/2018     Urinalysis:    Lab Results  Component Value Date/Time   SPECGRAVITY 1.021 01/15/2018 1147   GLUCOSEUR Negative 01/15/2018 1147   KETONES Trace (A) 01/15/2018 1147   NITRITE Negative 01/15/2018 1147   RBCURINE >150 (A) 09/08/2009 0356   BACTERIA Few (A) 09/08/2009 0356        Imaging  Reviewed   Assessment/Plan     I anticipate this patient is appropriate for observation status at this time.    Renal colic- (present on admission)   Assessment & Plan    History of renal stones.     CT on presentation revealing \"No hydronephrosis or nephrolithiasis. Minimal dilatation of the proximal left ureter. No left ureteral stone identified. No distal ureteral stone identified. Multiple left-sided pelvic phleboliths are present. Normal appearance of the appendix\".     Case discussed by ERP with urology, Dr Vanessa  Recommended Nuclear diuretic washout study  No hematuria / evidence of UTI on UA  Dueñas placed in ER given patient feels of inability to urinate    Recommendations  - Admission for observation / pain control  - Obtain US scrotum  - Awaiting nuclear washout study  - Urology consultation inpatient / outpatient based on above results  - Recheck UA, UCx. Check urine GC  - Continue nausea and pain control  - IVF hydration / Flomax  - x 1 dose of IV ceftriaxone pending further evaluation  - Check ESR, CRP, procalcitonin levels              VTE prophylaxis: SCD / Lovenox.         "

## 2018-01-16 NOTE — DISCHARGE INSTRUCTIONS
Discharge Instructions    Discharged to home by car with relative. Discharged via walking, hospital escort: Yes.  Special equipment needed: Not Applicable    Be sure to schedule a follow-up appointment with your primary care doctor or any specialists as instructed.     Discharge Plan:   Diet Plan: Discussed (Regular)  Activity Level: Discussed (As tolerated)  Confirmed Follow up Appointment: Patient to Call and Schedule Appointment  Confirmed Symptoms Management: Discussed  Medication Reconciliation Updated: Yes  Influenza Vaccine Indication: Patient Refuses    I understand that a diet low in cholesterol, fat, and sodium is recommended for good health. Unless I have been given specific instructions below for another diet, I accept this instruction as my diet prescription.   Other diet: Regular    Special Instructions: None    · Is patient discharged on Warfarin / Coumadin?   No     · Is patient Post Blood Transfusion?  No    Depression / Suicide Risk    As you are discharged from this [facility] facility, it is important to learn how to keep safe from harming yourself.    Recognize the warning signs:  · Abrupt changes in personality, positive or negative- including increase in energy   · Giving away possessions  · Change in eating patterns- significant weight changes-  positive or negative  · Change in sleeping patterns- unable to sleep or sleeping all the time   · Unwillingness or inability to communicate  · Depression  · Unusual sadness, discouragement and loneliness  · Talk of wanting to die  · Neglect of personal appearance   · Rebelliousness- reckless behavior  · Withdrawal from people/activities they love  · Confusion- inability to concentrate     If you or a loved one observes any of these behaviors or has concerns about self-harm, here's what you can do:  · Talk about it- your feelings and reasons for harming yourself  · Remove any means that you might use to hurt yourself (examples: pills, rope,  extension cords, firearm)  · Get professional help from the community (Mental Health, Substance Abuse, psychological counseling)  · Do not be alone:Call your Safe Contact- someone whom you trust who will be there for you.  · Call your local CRISIS HOTLINE 640-3090 or 244-562-3991  · Call your local Children's Mobile Crisis Response Team [place] (368) 590-0502 or www.[employer]  · Call the toll free National Suicide Prevention Hotlines   · National Suicide Prevention Lifeline 431-079-RPTC (4273)  · National Hope Line Network 800-SUICIDE (946-8588)

## 2018-01-16 NOTE — DISCHARGE SUMMARY
DATE OF DISCHARGE:  01/16/2018.    PRIMARY CARE PHYSICIAN:  Roya Royal MD.    DISCHARGE DIAGNOSES:  1.  Left-sided renal colic.  2.  History of nephrolithiasis.  3.  Nausea, vomiting.  4.  Lactic acidosis, resolved.  5.  Hypomagnesemia.  6.  Hypophosphatemia.    DISCHARGE MEDICATIONS:  1.  Zyrtec 10 mg daily.  2.  Multivitamin daily.    MAJOR STUDIES AND PROCEDURES PERFORMED WHILE INPATIENT:  1.  CT scan renal colic evaluation on 01/15/2018.  No hydronephrosis or   nephrolithiasis.  Minimal dilatation of the proximal left ureter.  No left   ureteral stone identified.  No distal ureteral stone identified and multiple   left-sided pelvic phleboliths are present.  Normal appearance of the appendix.  2.  Nuclear medicine washout study on 01/15/2018.  Minimally delayed perfusion   of the left kidney without evidence of ureteral obstruction, mildly prominent   right renal collecting system with slightly delayed clearance possibly   indicating low-grade obstruction.  3.  Ultrasound of the scrotum on 01/15/2018.  Probable left rectocele, right   epididymal cyst, calcification of the testicle, likely benign.  4.  Urinalysis, negative for occult blood, negative nitrite, negative   leukocyte esterase.    HOSPITAL COURSE:  This is a 27-year-old male.  He has a history of   nephrolithiasis.  He has had 3-4 episodes over the past several years.  He has   never had any specific intervention or surgery for this.  The patient   presented with symptoms of left-sided renal colic, severe pain in his left   side.  This is associated with intractable nausea, vomiting.  The patient was   dehydrated with lactic acidosis on presentation.  The patient's symptoms have   gradually resolved over the night and he is currently pain free.  He has not   received any pain medicine for sometime.    Workup included the above studies.  CT scan showed no evidence of   nephrolithiasis.  He did have a nuclear medicine washout study.  It shows    possible slowed washout of the right.  I do not know the significance of this   as his symptoms were on the left.  We discussed followup with urology as an   outpatient to discuss.    DISCHARGE DISPOSITION:  To home.    FOLLOWUP PLAN:  Call Dr. Vanessa's office for an appointment.    DIET:  Regular as tolerated.       ____________________________________     MD ORI PHOENIX / MADI    DD:  01/16/2018 07:45:24  DT:  01/16/2018 08:32:57    D#:  [id number]  Job#:  [id number]

## 2018-01-16 NOTE — ASSESSMENT & PLAN NOTE
"History of renal stones.     CT on presentation revealing \"No hydronephrosis or nephrolithiasis. Minimal dilatation of the proximal left ureter. No left ureteral stone identified. No distal ureteral stone identified. Multiple left-sided pelvic phleboliths are present. Normal appearance of the appendix\".     Case discussed by ERP with urology, Dr Vanessa  Recommended Nuclear diuretic washout study  No hematuria / evidence of UTI on UA  Dueñas placed in ER given patient feels of inability to urinate    Recommendations  - Admission for observation / pain control  - Obtain US scrotum  - Awaiting nuclear washout study  - Urology consultation inpatient / outpatient based on above results  - Recheck UA, UCx. Check urine GC  - Continue nausea and pain control  - IVF hydration / Flomax  - x 1 dose of IV ceftriaxone pending further evaluation  - Check ESR, CRP, procalcitonin levels    "

## 2018-01-16 NOTE — PROGRESS NOTES
Pt resting in bed, denies any pain or discomfort at this time. VSS on room air. Pt refusing to wear continuous pulse ox overnight.

## 2018-01-16 NOTE — PROGRESS NOTES
Pt discharged to home. IV d/c'd, pt armband removed. Pt  understands written and verbal d/c instructions.  No new rx's.  Regular diet, activity as tolerated.  Pt to follow up with urology as needed.  Pt verbalized understanding.

## 2018-01-17 LAB
BACTERIA UR CULT: NORMAL
SIGNIFICANT IND 70042: NORMAL
SITE SITE: NORMAL
SOURCE SOURCE: NORMAL

## 2018-01-20 LAB
BACTERIA BLD CULT: NORMAL
SIGNIFICANT IND 70042: NORMAL
SITE SITE: NORMAL
SOURCE SOURCE: NORMAL

## 2018-01-21 LAB
BACTERIA BLD CULT: ABNORMAL
BACTERIA BLD CULT: ABNORMAL
SIGNIFICANT IND 70042: ABNORMAL
SITE SITE: ABNORMAL
SOURCE SOURCE: ABNORMAL

## 2018-01-23 ENCOUNTER — TELEPHONE (OUTPATIENT)
Dept: HOSPITALIST | Facility: MEDICAL CENTER | Age: 28
End: 2018-01-23

## 2018-04-05 ENCOUNTER — OFFICE VISIT (OUTPATIENT)
Dept: URGENT CARE | Facility: PHYSICIAN GROUP | Age: 28
End: 2018-04-05
Payer: COMMERCIAL

## 2018-04-05 VITALS
RESPIRATION RATE: 16 BRPM | HEIGHT: 67 IN | BODY MASS INDEX: 27.31 KG/M2 | TEMPERATURE: 97.9 F | SYSTOLIC BLOOD PRESSURE: 122 MMHG | WEIGHT: 174 LBS | HEART RATE: 79 BPM | OXYGEN SATURATION: 94 % | DIASTOLIC BLOOD PRESSURE: 68 MMHG

## 2018-04-05 DIAGNOSIS — J40 BRONCHITIS: ICD-10-CM

## 2018-04-05 PROCEDURE — 99213 OFFICE O/P EST LOW 20 MIN: CPT | Performed by: EMERGENCY MEDICINE

## 2018-04-05 RX ORDER — BENZONATATE 100 MG/1
100 CAPSULE ORAL 3 TIMES DAILY PRN
Qty: 60 CAP | Refills: 0 | Status: SHIPPED | OUTPATIENT
Start: 2018-04-05 | End: 2019-01-09

## 2018-04-05 RX ORDER — DEXTROMETHORPHAN POLISTIREX 30 MG/5ML
60 SUSPENSION ORAL 2 TIMES DAILY
COMMUNITY
End: 2019-01-09

## 2018-04-05 RX ORDER — PSEUDOEPHEDRINE HYDROCHLORIDE 60 MG/1
60 TABLET, FILM COATED ORAL EVERY 4 HOURS PRN
COMMUNITY
End: 2019-01-09

## 2018-04-05 RX ORDER — DOXYCYCLINE HYCLATE 100 MG
100 TABLET ORAL 2 TIMES DAILY
Qty: 20 TAB | Refills: 0 | Status: SHIPPED | OUTPATIENT
Start: 2018-04-05 | End: 2019-01-09

## 2018-04-05 RX ORDER — DEXTROMETHORPHAN HBR. AND GUAIFENESIN 10; 100 MG/5ML; MG/5ML
10 SOLUTION ORAL EVERY 4 HOURS PRN
COMMUNITY
End: 2019-01-09

## 2018-04-05 ASSESSMENT — ENCOUNTER SYMPTOMS
SINUS PAIN: 1
ABDOMINAL PAIN: 0
SPEECH CHANGE: 0
VOMITING: 0
SPUTUM PRODUCTION: 0
SENSORY CHANGE: 0
NERVOUS/ANXIOUS: 0
EYE REDNESS: 0
NECK PAIN: 0
FEVER: 0
STRIDOR: 0
NAUSEA: 0
EYE DISCHARGE: 0
BACK PAIN: 0
COUGH: 1

## 2018-04-05 NOTE — PROGRESS NOTES
Subjective:      Ethan Gerber is a 27 y.o. male who presents with Cough (x 3 weeks)            HPI  Patient is a pleasant 27-year-old male nonsmoker with a history of 3 weeks of cough. Patient has tried Mucinex DM as well as Robitussin. He is stated away from any Benadryl containing over-the-counter medications. States that he has a persistent cough during the night it was getting better and now is return more of a bronchitic than sinusitis symptomatology. Patient is a nonsmoker.    No Known Allergies   Social History     Social History   • Marital status:      Spouse name: N/A   • Number of children: N/A   • Years of education: N/A     Occupational History   • Not on file.     Social History Main Topics   • Smoking status: Never Smoker   • Smokeless tobacco: Never Used   • Alcohol use No   • Drug use: No   • Sexual activity: Yes     Partners: Female     Other Topics Concern   • Not on file     Social History Narrative   • No narrative on file     Past Medical History:   Diagnosis Date   • Kidney stone      Current Outpatient Prescriptions on File Prior to Visit   Medication Sig Dispense Refill   • cetirizine (ZYRTEC) 10 MG Tab Take 10 mg by mouth every day.     • therapeutic multivitamin-minerals (THERAGRAN-M) Tab Take 1 Tab by mouth every day.       No current facility-administered medications on file prior to visit.      Family History   Problem Relation Age of Onset   • Cancer Mother 44     lung cancer/ smoker     Review of Systems   Constitutional: Negative for fever.   HENT: Positive for congestion and sinus pain.    Eyes: Negative for discharge and redness.   Respiratory: Positive for cough. Negative for sputum production and stridor.    Gastrointestinal: Negative for abdominal pain, nausea and vomiting.   Musculoskeletal: Negative for back pain and neck pain.   Skin: Negative for rash.   Neurological: Negative for sensory change and speech change.   Psychiatric/Behavioral: The patient is not  "nervous/anxious.           Objective:     Blood pressure 122/68, pulse 79, temperature 36.6 °C (97.9 °F), resp. rate 16, height 1.702 m (5' 7\"), weight 78.9 kg (174 lb), SpO2 94 %.      Physical Exam   Constitutional: He appears well-developed and well-nourished. No distress.   HENT:   Head: Normocephalic and atraumatic.   Right Ear: External ear normal.   Left Ear: External ear normal.   Mouth/Throat: No oropharyngeal exudate.   TMs are normal, canals clear patient complains of fullness on the right side   Eyes: Conjunctivae are normal. Right eye exhibits no discharge. Left eye exhibits no discharge.   Neck: Normal range of motion.   Cardiovascular: Normal rate.    Pulmonary/Chest: Effort normal.   Musculoskeletal: Normal range of motion.   Neurological: He is alert.   Skin: Skin is warm and dry. He is not diaphoretic. No erythema.   Psychiatric: He has a normal mood and affect. His behavior is normal.   Nursing note and vitals reviewed.              Assessment/Plan:     Diagnosis: URI      I am recommending the patient initiate/ continue hydration efforts including the use of a vaporizer/humidifier/ netti pot. I also recommend the pt, initiate Mucinex (if older than 4) Sudafed or Dayquil if not hypertensive. In addition the patient will initiate the prescribed prescription medication/s: Patient will be given Tessalon Perles and a contingency plan of Vibramycin avoid the sun. If the patient's condition exacerbates with worsening dysphagia, shortness of breath, uncontrolled fever, headache or chest pressure he/she will return immediately to the urgent care or go to  the emergency department for further evaluation.    Chava Alston    "

## 2018-04-05 NOTE — LETTER
April 5, 2018        Ethan Gerber  [address] [facility] [facility] [place] [address]        Dear Ethan:    This is to verify that you were in the UC this AM.    If you have any questions or concerns, please don't hesitate to call.        Sincerely,        Chava Alston M.D.    Electronically Signed

## 2018-12-20 ENCOUNTER — APPOINTMENT (OUTPATIENT)
Dept: INTERNAL MEDICINE | Facility: MEDICAL CENTER | Age: 28
End: 2018-12-20
Payer: COMMERCIAL

## 2019-01-09 ENCOUNTER — OFFICE VISIT (OUTPATIENT)
Dept: INTERNAL MEDICINE | Facility: MEDICAL CENTER | Age: 29
End: 2019-01-09
Payer: COMMERCIAL

## 2019-01-09 VITALS
TEMPERATURE: 98 F | HEIGHT: 67 IN | WEIGHT: 171 LBS | SYSTOLIC BLOOD PRESSURE: 119 MMHG | DIASTOLIC BLOOD PRESSURE: 72 MMHG | OXYGEN SATURATION: 95 % | BODY MASS INDEX: 26.84 KG/M2 | HEART RATE: 62 BPM

## 2019-01-09 DIAGNOSIS — N50.3 EPIDIDYMAL CYST: ICD-10-CM

## 2019-01-09 DIAGNOSIS — N20.0 RENAL STONES: ICD-10-CM

## 2019-01-09 DIAGNOSIS — Z91.09 ENVIRONMENTAL ALLERGIES: ICD-10-CM

## 2019-01-09 DIAGNOSIS — R59.0 LYMPHADENOPATHY, PERIAURICULAR: ICD-10-CM

## 2019-01-09 PROCEDURE — 99204 OFFICE O/P NEW MOD 45 MIN: CPT | Mod: GC | Performed by: INTERNAL MEDICINE

## 2019-01-09 ASSESSMENT — PATIENT HEALTH QUESTIONNAIRE - PHQ9: CLINICAL INTERPRETATION OF PHQ2 SCORE: 0

## 2019-01-09 NOTE — PROGRESS NOTES
New Patient to Establish    Reason to establish: New patient to establish    CC: Establish care, renal stones, swollen lymph nodes.    HPI: , 28 year old male with past medical history significant for recurrent nephrolithiasis , seasonal allergies presents to the clinic to establish care.    Reports to be doing well. States that he has been sick with chest congestion, cold and cough in most of Nov and Dec, has had swelling around the ear since then, which was painful initially, not painful currently, has gone down in size a lot. He is visiting a dentist and has been fixing some of his teeth. Hx of renal stones since the age of 17, states that his diet and habits of not drinking water might have contributed to symptoms. Last episode was in 1/2018, has been doing well since. No new symptoms. Has been hydrating himself well and eats healthy diet. Does not smoke, does not drink alcohol, does not use recreational drugs. Works at [employer], was a nurse previously. Lives with wife and 2 young kids.     Denies fever, chills, nausea, vomiting, chest pain, shortness of breath, abdominal pain, urinary or bowel symptoms.    Patient Active Problem List    Diagnosis Date Noted   • Renal stones 01/09/2019       Past Medical History:   Diagnosis Date   • Kidney stone        Current Outpatient Prescriptions   Medication Sig Dispense Refill   • cetirizine (ZYRTEC) 10 MG Tab Take 10 mg by mouth every day.       No current facility-administered medications for this visit.        Allergies as of 01/09/2019   • (No Known Allergies)       Social History     Social History   • Marital status:      Spouse name: N/A   • Number of children: N/A   • Years of education: N/A     Occupational History   • Not on file.     Social History Main Topics   • Smoking status: Never Smoker   • Smokeless tobacco: Never Used   • Alcohol use No   • Drug use: No   • Sexual activity: Yes     Partners: Female     Other Topics Concern   • Not on file  "    Social History Narrative   • No narrative on file       Family History   Problem Relation Age of Onset   • Cancer Mother 44        lung cancer/ smoker   • Bipolar disorder Mother        No past surgical history on file.    ROS: As per HPI. Additional pertinent symptoms as noted below.    All others negative    /72 (BP Location: Left arm, Patient Position: Sitting, BP Cuff Size: Adult)   Pulse 62   Temp 36.7 °C (98 °F) (Temporal)   Ht 1.702 m (5' 7\")   Wt 77.6 kg (171 lb)   SpO2 95%   BMI 26.78 kg/m²     Physical Exam  General:  Alert and oriented, No apparent distress.    Eyes: Pupils equal and reactive. No scleral icterus.    Ears : Firm, mobile LN palpated over the mastoid process. Unable to appreciate pre-auricular lymph nodes, likely healing.    Throat: Clear no erythema or exudates noted.    Neck: Supple. No lymphadenopathy noted. Thyroid not enlarged.    Lungs: Clear to auscultation and percussion bilaterally.    Cardiovascular: Regular rate and rhythm. No murmurs, rubs or gallops.    Abdomen:  Benign. No rebound or guarding noted.    Extremities: No clubbing, cyanosis, edema.    Skin: Clear. No rash or suspicious skin lesions noted.      Assessment and Plan    # Renal stones  - long standing history of nephrolithiasis  - currently asymptomatic  - discussed about diet and hydration    # Lymphadenopathy, periauricular  - likely reactive due to illness, resolving.  - no acute intervention now.    # Environmental allergies  - continue zyrtec as needed.    # Epididymal cyst  - currently asymptomatic, found on imaging done in 2018  - unable to follow up with urology at that time.  - referral given to urology    CBC, CMP, TSH, lipid profile ordered. Follow up in 5 weeks.    Risk Assessment (discuss potential complications a function of chronic problems): Low risk    Complexity (discuss number of co-morbidities): Recurrent renal stones., seasonal allergies , reactive lymphadenopathy.    Signed by: " Trinh Elliott M.D.

## 2019-01-09 NOTE — PATIENT INSTRUCTIONS
Continue to eat healthy diet and exercise regularly.  Draw labs before next visit.  Follow up in 5 weeks.

## 2019-02-15 ENCOUNTER — APPOINTMENT (OUTPATIENT)
Dept: INTERNAL MEDICINE | Facility: MEDICAL CENTER | Age: 29
End: 2019-02-15
Payer: COMMERCIAL

## 2019-03-06 ENCOUNTER — TELEPHONE (OUTPATIENT)
Dept: INTERNAL MEDICINE | Facility: MEDICAL CENTER | Age: 29
End: 2019-03-06

## 2019-03-06 NOTE — TELEPHONE ENCOUNTER
VOICEMAIL  1. Caller Name: Ethan Gerber     Call Back Number: [phone] (home)       2. Message: Took son to his pediatrician and he was diagnosed with Flu.  He is asking if we can prescribe him Tamiflu as well.  He is getting the symptoms his son has.     3. Patient approves office to leave a detailed voicemail/MyChart message: N\A    Please advise.

## 2019-03-08 RX ORDER — OSELTAMIVIR PHOSPHATE 75 MG/1
75 CAPSULE ORAL 2 TIMES DAILY
Qty: 10 CAP | Refills: 0 | Status: SHIPPED | OUTPATIENT
Start: 2019-03-08 | End: 2019-09-30

## 2019-03-08 NOTE — TELEPHONE ENCOUNTER
Please let the patient know that Tamiflu 75 mg BID for 7 days has been ordered for it, he can start taking it today. Let him know that he has to see a provider if he starts to have fever and respiratory symptoms despite taking the medication. Feel free to answer questions. Thanks

## 2019-03-12 NOTE — TELEPHONE ENCOUNTER
Called patient and he received a course of tamiflu from son's peds. He had taken it as he had symptoms but ended up causing diarrhea and vomiting episodes. So he stopped taking it and feels a little better now

## 2019-09-30 ENCOUNTER — OFFICE VISIT (OUTPATIENT)
Dept: MEDICAL GROUP | Facility: PHYSICIAN GROUP | Age: 29
End: 2019-09-30
Payer: COMMERCIAL

## 2019-09-30 ENCOUNTER — TELEPHONE (OUTPATIENT)
Dept: SCHEDULING | Facility: IMAGING CENTER | Age: 29
End: 2019-09-30

## 2019-09-30 ENCOUNTER — HOSPITAL ENCOUNTER (OUTPATIENT)
Dept: LAB | Facility: MEDICAL CENTER | Age: 29
End: 2019-09-30
Attending: FAMILY MEDICINE
Payer: COMMERCIAL

## 2019-09-30 VITALS
TEMPERATURE: 98 F | HEIGHT: 67 IN | WEIGHT: 167.8 LBS | SYSTOLIC BLOOD PRESSURE: 110 MMHG | RESPIRATION RATE: 16 BRPM | OXYGEN SATURATION: 94 % | DIASTOLIC BLOOD PRESSURE: 60 MMHG | HEART RATE: 82 BPM | BODY MASS INDEX: 26.34 KG/M2

## 2019-09-30 DIAGNOSIS — Z23 NEED FOR VACCINATION: ICD-10-CM

## 2019-09-30 DIAGNOSIS — K92.1 BLOOD IN STOOL: ICD-10-CM

## 2019-09-30 LAB
ALBUMIN SERPL BCP-MCNC: 5.4 G/DL (ref 3.2–4.9)
ALBUMIN/GLOB SERPL: 2 G/DL
ALP SERPL-CCNC: 47 U/L (ref 30–99)
ALT SERPL-CCNC: 23 U/L (ref 2–50)
ANION GAP SERPL CALC-SCNC: 10 MMOL/L (ref 0–11.9)
AST SERPL-CCNC: 18 U/L (ref 12–45)
BILIRUB SERPL-MCNC: 0.5 MG/DL (ref 0.1–1.5)
BUN SERPL-MCNC: 12 MG/DL (ref 8–22)
CALCIUM SERPL-MCNC: 10 MG/DL (ref 8.5–10.5)
CHLORIDE SERPL-SCNC: 104 MMOL/L (ref 96–112)
CO2 SERPL-SCNC: 28 MMOL/L (ref 20–33)
CREAT SERPL-MCNC: 1.02 MG/DL (ref 0.5–1.4)
ERYTHROCYTE [DISTWIDTH] IN BLOOD BY AUTOMATED COUNT: 39.7 FL (ref 35.9–50)
FASTING STATUS PATIENT QL REPORTED: NORMAL
GLOBULIN SER CALC-MCNC: 2.7 G/DL (ref 1.9–3.5)
GLUCOSE SERPL-MCNC: 76 MG/DL (ref 65–99)
HCT VFR BLD AUTO: 47.8 % (ref 42–52)
HGB BLD-MCNC: 15.3 G/DL (ref 14–18)
MCH RBC QN AUTO: 27.6 PG (ref 27–33)
MCHC RBC AUTO-ENTMCNC: 32 G/DL (ref 33.7–35.3)
MCV RBC AUTO: 86.1 FL (ref 81.4–97.8)
PLATELET # BLD AUTO: 253 K/UL (ref 164–446)
PMV BLD AUTO: 10.5 FL (ref 9–12.9)
POTASSIUM SERPL-SCNC: 3.9 MMOL/L (ref 3.6–5.5)
PROT SERPL-MCNC: 8.1 G/DL (ref 6–8.2)
RBC # BLD AUTO: 5.55 M/UL (ref 4.7–6.1)
SODIUM SERPL-SCNC: 142 MMOL/L (ref 135–145)
WBC # BLD AUTO: 6.3 K/UL (ref 4.8–10.8)

## 2019-09-30 PROCEDURE — 99214 OFFICE O/P EST MOD 30 MIN: CPT | Mod: 25 | Performed by: FAMILY MEDICINE

## 2019-09-30 PROCEDURE — 80053 COMPREHEN METABOLIC PANEL: CPT

## 2019-09-30 PROCEDURE — 90686 IIV4 VACC NO PRSV 0.5 ML IM: CPT | Performed by: FAMILY MEDICINE

## 2019-09-30 PROCEDURE — 85027 COMPLETE CBC AUTOMATED: CPT

## 2019-09-30 PROCEDURE — 90471 IMMUNIZATION ADMIN: CPT | Performed by: FAMILY MEDICINE

## 2019-09-30 PROCEDURE — 36415 COLL VENOUS BLD VENIPUNCTURE: CPT

## 2019-09-30 SDOH — HEALTH STABILITY: MENTAL HEALTH: HOW OFTEN DO YOU HAVE A DRINK CONTAINING ALCOHOL?: NEVER

## 2019-09-30 NOTE — PROGRESS NOTES
Subjective:     Chief Complaint   Patient presents with   • Establish Care     blood in stool 2-3 days        HPI:   Ethan presents today to establish care and to report acute onset of bloody stools of 3 days duration.  Previous PCP: Dr Tsang    Blood in stool  This is a new condition.  Patient presents for same-day visit endorsing bloody stools. The patient reports that he had a recent travel to [place]. He was there for 1 week. Then he started feeling bloated and had diffuse abdominal pain and loose stools. He then started feeling better but then 2 days ago in the AM he started feeling some general malaise. Started having loose stools again and over the past 2 days he began to notice bright red blood without diarrhea.  This is the first time that this has happened.  He brought in pictures of his stools to verify this. No one else in the family having similar symptoms.   Onset: General symptoms approximately 15 days ago and most recently bloody stools of 2 days    Duration: Has been going on consistently daily over the past 2 days. He has had a about 3 blood stools ever since.   Severity: Denies any rectal pain at this time. However he does have left upper quadrant abdominal pain.   Quality: Pain can be described as dull.   Associated symptoms: Endorses some fatigue   Home treatments: Has tried OTC probiotics and pepto-bismul         Past Medical History:   Diagnosis Date   • Kidney stone    • Nephrolithiasis        Social History     Tobacco Use   • Smoking status: Never Smoker   • Smokeless tobacco: Never Used   Substance Use Topics   • Alcohol use: Never     Frequency: Never   • Drug use: No       Current Outpatient Medications Ordered in Epic   Medication Sig Dispense Refill   • cetirizine (ZYRTEC) 10 MG Tab Take 10 mg by mouth every day.       No current [facility]-ordered facility-administered medications on file.        Allergies:  Patient has no known allergies.    Health Maintenance: Completed    ROS:  Gen: no  "fevers/chills, no changes in weight  Eyes: no changes in vision  ENT: no sore throat, no hearing loss, no bloody nose  Pulm: no sob, no cough  CV: no chest pain, no palpitations  GI: no nausea/vomiting, positive bloody stools.  : no dysuria  MSk: no myalgias  Skin: no rash  Neuro: no headaches, no numbness/tingling  Heme/Lymph: no easy bruising      Objective:       Exam:  /60 (BP Location: Left arm, Patient Position: Sitting, BP Cuff Size: Adult)   Pulse 82   Temp 36.7 °C (98 °F) (Temporal)   Resp 16   Ht 1.702 m (5' 7\")   Wt 76.1 kg (167 lb 12.8 oz)   SpO2 94%   BMI 26.28 kg/m²  Body mass index is 26.28 kg/m².    Gen: Alert and oriented, No apparent distress.  Neck: Neck is supple without lymphadenopathy.  Lungs: Normal effort, CTA bilaterally, no wheezes, rhonchi, or rales  CV: Regular rate and rhythm. No murmurs, rubs, or gallops.  Abdomen: Soft, nontender, nondistended.  Hyperactive bowel sounds.  There is no rebound tenderness.  Liver and spleen are not palpable  Ext: No clubbing, cyanosis, edema.      Assessment & Plan:     29 y.o. male with the following -     1. Blood in stool  This is a new problem.  The patient presents with acute onset hematochezia without diarrhea of 2 to 3 days duration.  Of note over the past week and a half he has had gastroenteritis type of symptomatology.  However, he appears to feel better and is no longer having loose stools.  He provided me with pictures and it is evident that he has bright red blood surrounding his stools.  Certainly, this could be secondary to gastroenteritis though the fact that his overall symptoms have improved and now he has developed bright red blood per rectum raises my concern for inflammatory bowel disease as well.  The patient denies any history of colon cancer in the family.  Given that we are unable to rule out inflammatory bowel disease at this time, I would like to send out a referral to GI for further assessment.  In the meantime, " I recommend that he goes on a bland diet, avoiding spicy foods and caffeine.  I also encouraged hydration.  I recommend that he also logs his symptoms and continue to take pictures as needed.  I will also establish baseline labs.  Patient is agreeable to plan.  - CBC WITHOUT DIFFERENTIAL; Future  - REFERRAL TO GASTROENTEROLOGY  - Comp Metabolic Panel; Future    2. Need for vaccination  - Influenza Vaccine Quad Injection (PF)      Return in about 6 weeks (around 11/11/2019).    Please note that this dictation was created using voice recognition software. I have made every reasonable attempt to correct obvious errors, but I expect that there are errors of grammar and possibly content that I did not discover before finalizing the note.

## 2019-09-30 NOTE — ASSESSMENT & PLAN NOTE
This is a new condition.  Patient presents for same-day visit endorsing bloody stools. The patient reports that he had a recent travel to [place]. He was there for 1 week. Then he started feeling bloated and had diffuse abdominal pain and loose stools. He then started feeling better but then 2 days ago in the AM he started feeling some general malaise. Started having loose stools again and over the past 2 days he began to notice bright red blood without diarrhea.  This is the first time that this has happened.  He brought in pictures of his stools to verify this. No one else in the family having similar symptoms.   Onset: General symptoms approximately 15 days ago and most recently bloody stools of 2 days    Duration: Has been going on consistently daily over the past 2 days. He has had a about 3 blood stools ever since.   Severity: Denies any rectal pain at this time. However he does have left upper quadrant abdominal pain.   Quality: Pain can be described as dull.   Associated symptoms: Endorses some fatigue   Home treatments: Has tried OTC probiotics and pepto-bismul

## 2019-11-11 ENCOUNTER — OFFICE VISIT (OUTPATIENT)
Dept: MEDICAL GROUP | Facility: PHYSICIAN GROUP | Age: 29
End: 2019-11-11
Payer: COMMERCIAL

## 2019-11-11 ENCOUNTER — HOSPITAL ENCOUNTER (OUTPATIENT)
Dept: LAB | Facility: MEDICAL CENTER | Age: 29
End: 2019-11-11
Attending: FAMILY MEDICINE
Payer: COMMERCIAL

## 2019-11-11 VITALS
OXYGEN SATURATION: 97 % | SYSTOLIC BLOOD PRESSURE: 118 MMHG | RESPIRATION RATE: 16 BRPM | BODY MASS INDEX: 26.18 KG/M2 | WEIGHT: 166.8 LBS | TEMPERATURE: 97.9 F | HEIGHT: 67 IN | DIASTOLIC BLOOD PRESSURE: 66 MMHG | HEART RATE: 72 BPM

## 2019-11-11 DIAGNOSIS — K92.1 BLOOD IN STOOL: ICD-10-CM

## 2019-11-11 DIAGNOSIS — R14.0 BLOATED ABDOMEN: ICD-10-CM

## 2019-11-11 PROCEDURE — 99214 OFFICE O/P EST MOD 30 MIN: CPT | Performed by: FAMILY MEDICINE

## 2019-11-11 PROCEDURE — 36415 COLL VENOUS BLD VENIPUNCTURE: CPT

## 2019-11-11 PROCEDURE — 83013 H PYLORI (C-13) BREATH: CPT

## 2019-11-11 PROCEDURE — 82784 ASSAY IGA/IGD/IGG/IGM EACH: CPT

## 2019-11-11 PROCEDURE — 83516 IMMUNOASSAY NONANTIBODY: CPT

## 2019-11-11 RX ORDER — POLYETHYLENE GLYCOL 3350 17 G/17G
POWDER, FOR SOLUTION ORAL
COMMUNITY
Start: 2019-10-25 | End: 2021-06-10

## 2019-11-11 RX ORDER — FAMOTIDINE 40 MG/1
40 TABLET, FILM COATED ORAL DAILY
Qty: 60 TAB | Refills: 3 | Status: SHIPPED | OUTPATIENT
Start: 2019-11-11 | End: 2021-06-10

## 2019-11-11 RX ORDER — PANTOPRAZOLE SODIUM 40 MG/1
TABLET, DELAYED RELEASE ORAL
COMMUNITY
Start: 2019-10-30 | End: 2021-06-10

## 2019-11-11 RX ORDER — FAMOTIDINE 40 MG/1
40 TABLET, FILM COATED ORAL DAILY
Qty: 60 TAB | Refills: 3 | Status: SHIPPED | OUTPATIENT
Start: 2019-11-11 | End: 2019-11-11

## 2019-11-11 NOTE — PROGRESS NOTES
Subjective:     Chief Complaint   Patient presents with   • Follow-Up     GI issues, improvment since last visit        HPI:   Ethan presents today to discuss the following.    Blood in stool  This is chronic condition.   Symptom onset:  The patient returns today for follow-up after reporting bloody stools on 9/30/2019.  He recalls taking a recent trip to [place] a week prior when he suddenly developed bloatedness abdominal pain and loose stools with blood.  Current symptoms: The patient mentions he has resolution of his symptoms at this time. However he is still endorsing bloated abdomen and burping.   Since onset symptoms are: Unchanged  Modifying factors: Given my concern for IBD I sent the patient to GI for further evaluation.  It appears that a colonoscopy was recommended.  Treatments tried: He has tried to modified his diet by eating more fiber.   Associated symptoms: Denies any constipation.       Bloated abdomen  This is a new problem.  The patient mentions that though his bloody stools have subsided, he is experiencing bloated abdomen and belching. He reports this happening within 30 minutes of eating or drinking. He feels like this is affecting his quality of life. Drinks very little soda.   He was given protonix by GI but has not tried it yet  He has tried gasx with no relief  His BMs are regular   He does have some generalized GI discomfort      Past Medical History:   Diagnosis Date   • Kidney stone    • Nephrolithiasis        Social History     Tobacco Use   • Smoking status: Never Smoker   • Smokeless tobacco: Never Used   Substance Use Topics   • Alcohol use: Never     Frequency: Never   • Drug use: No       Current Outpatient Medications Ordered in Epic   Medication Sig Dispense Refill   • famotidine (PEPCID) 40 MG Tab Take 1 Tab by mouth every day. 60 Tab 3   • cetirizine (ZYRTEC) 10 MG Tab Take 10 mg by mouth every day.     • pantoprazole (PROTONIX) 40 MG Tablet Delayed Response      •  "polyethylene glycol 3350 (MIRALAX) Powder        No current Epic-ordered facility-administered medications on file.        Allergies:  Patient has no known allergies.    Health Maintenance: Completed    ROS:  Gen: no fevers/chills, no changes in weight  Eyes: no changes in vision  ENT: no sore throat, no hearing loss, no bloody nose  Pulm: no sob, no cough  CV: no chest pain, no palpitations  GI: no nausea/vomiting, no diarrhea  Skin: no rash    Objective:       Exam:  /66 (BP Location: Left arm, Patient Position: Sitting, BP Cuff Size: Adult)   Pulse 72   Temp 36.6 °C (97.9 °F) (Temporal)   Resp 16   Ht 1.702 m (5' 7\")   Wt 75.7 kg (166 lb 12.8 oz)   SpO2 97%   BMI 26.12 kg/m²  Body mass index is 26.12 kg/m².    Gen: Alert and oriented, No apparent distress.  HENT: TMs are clear. Oropharynx is w/o erythema or exudates. Neck is supple without cervical lymphadenopathy. No JVD.   Eyes: PERRLA  Lungs: Normal effort, CTA bilaterally, no wheezes, rhonchi, or rales  CV: Regular rate and rhythm. No murmurs, rubs, or gallops.  Abdomen: Soft, nontender, nondistended. Normal bowel sounds. Liver and spleen are not palpable  Ext: No clubbing, cyanosis, edema.  Skin: no rash, lesions or ulcers  Neuro: Moves all extremities.  Psych: AAOx3      Assessment & Plan:     29 y.o. male with the following -     1. Blood in stool  This is a chronic condition, stable.  I evaluated the patient for bloody stools on 9/30/2019.  IBD was considered in the differential and I sent him to a GI specialist for consideration of a colonoscopy.  It appears that constipation was also a concern and thus was given a trial of MiraLAX.  He tried this for a couple of days he noticed not much change.  Fortunately, his symptoms have resolved and he is no longer noticing bloody stools.  He also has normal consistency of his bowel movements.  He still has some generalized abdominal discomfort and associated bloated abdomen with belching see #2.  At " this time I would like to rule out celiac disease by ordering an antibody panel.  -Continue to follow-up with GI  - Continue to monitor stools  - CELIAC DISEASE AB PANEL; Future    2. Bloated abdomen  This is a new problem.  The patient mentions that though his bloody stools are resolved he is now experiencing bloated abdomen with multiple episodes of belching during the day.  I am uncertain of the exact etiology but in the differential I do consider GERD versus H. pylori versus celiac disease in the differential.  IBS is also considered.  He has tried over-the-counter medications including Gas-X without relief.  He has not been able to start GERD medication at this time.   I will initiate a trial of famotidine to see if this relieves his pain.  I will also do an H. pylori breath test to rule out infection.  Recommend he gets this done before initiating H2 blockade medication to avoid false negatives.  As mentioned, I will also order celiac serology testing.  I also recommend he follows up with GI and consideration of a possible EGD to rule out any obstruction.  The patient verbalized understanding.  - CELIAC DISEASE AB PANEL; Future  - famotidine (PEPCID) 40 MG Tab; Take 1 Tab by mouth every day.  Dispense: 60 Tab; Refill: 3  - H. PYLORI, UREA BREATH TEST, ADULT; Future      Return in about 6 weeks (around 12/23/2019) for FU on belching .    Please note that this dictation was created using voice recognition software. I have made every reasonable attempt to correct obvious errors, but I expect that there are errors of grammar and possibly content that I did not discover before finalizing the note.

## 2019-11-11 NOTE — ASSESSMENT & PLAN NOTE
This is chronic condition.   Symptom onset:  The patient returns today for follow-up after reporting bloody stools on 9/30/2019.  He recalls taking a recent trip to [place] a week prior when he suddenly developed bloatedness abdominal pain and loose stools with blood.  Current symptoms: The patient mentions he has resolution of his symptoms at this time. However he is still endorsing bloated abdomen and burping.   Since onset symptoms are: Unchanged  Modifying factors: Given my concern for IBD I sent the patient to GI for further evaluation.  It appears that a colonoscopy was recommended.  Treatments tried: He has tried to modified his diet by eating more fiber.   Associated symptoms: Denies any constipation.

## 2019-11-11 NOTE — ASSESSMENT & PLAN NOTE
This is a new problem.  The patient mentions that though his bloody stools have subsided, he is experiencing bloated abdomen and belching. He reports this happening within 30 minutes of eating or drinking. He feels like this is affecting his quality of life. Drinks very little soda.   He was given protonix by GI but has not tried it yet  He has tried gasx with no relief  His BMs are regular   He does have some generalized GI discomfort

## 2019-11-13 LAB
IGA SERPL-MCNC: 213 MG/DL (ref 68–408)
TTG IGA SER IA-ACNC: 1 U/ML (ref 0–3)
UREA BREATH TEST QL: NEGATIVE

## 2019-12-23 ENCOUNTER — OFFICE VISIT (OUTPATIENT)
Dept: MEDICAL GROUP | Facility: PHYSICIAN GROUP | Age: 29
End: 2019-12-23
Payer: COMMERCIAL

## 2019-12-23 VITALS
TEMPERATURE: 98.7 F | OXYGEN SATURATION: 93 % | HEART RATE: 64 BPM | DIASTOLIC BLOOD PRESSURE: 78 MMHG | RESPIRATION RATE: 16 BRPM | HEIGHT: 67 IN | WEIGHT: 163.8 LBS | BODY MASS INDEX: 25.71 KG/M2 | SYSTOLIC BLOOD PRESSURE: 116 MMHG

## 2019-12-23 DIAGNOSIS — K51.819 OTHER ULCERATIVE COLITIS WITH COMPLICATION (HCC): ICD-10-CM

## 2019-12-23 DIAGNOSIS — K22.10 EROSIVE ESOPHAGITIS: ICD-10-CM

## 2019-12-23 DIAGNOSIS — R14.0 BLOATED ABDOMEN: ICD-10-CM

## 2019-12-23 DIAGNOSIS — K29.60 OTHER GASTRITIS WITHOUT HEMORRHAGE, UNSPECIFIED CHRONICITY: ICD-10-CM

## 2019-12-23 PROBLEM — K51.90 ULCERATIVE COLITIS (HCC): Status: ACTIVE | Noted: 2019-12-23

## 2019-12-23 PROBLEM — K29.70 GASTRITIS: Status: ACTIVE | Noted: 2019-12-23

## 2019-12-23 PROCEDURE — 99214 OFFICE O/P EST MOD 30 MIN: CPT | Performed by: FAMILY MEDICINE

## 2019-12-23 RX ORDER — METRONIDAZOLE 500 MG/1
TABLET ORAL
COMMUNITY
Start: 2019-11-22 | End: 2021-06-10

## 2019-12-23 NOTE — PROGRESS NOTES
Subjective:     Chief Complaint   Patient presents with   • Follow-Up     discomfort    • Medication Management       HPI:   Ethan presents today to discuss the following.    Bloated abdomen  This is a new problem.  Onset: The patient mentions that he has experienced bloody stools in the past - and though this has resolved, he has been experiencing bloated abdomen and belching.   Modifying factors:  He followed up with GI - and is s/p EGD and colonoscopy.  He was found to have gastritis and esophagitis along with ulcerative colitis.  Treatments tried: He is in the process of starting mesalamine.   He continues to have persistent belching. Has not started PPI.   Associated sx: none    Ulcerative colitis (HCC)  This is a chronic condition.   He had bloody stools and I referred to GI. The patient is s/p colonoscopy and was found to have UC.   He denies any acute bloody stools at this time.   He is in the process of starting mesalamine.       Past Medical History:   Diagnosis Date   • Kidney stone    • Nephrolithiasis        Social History     Tobacco Use   • Smoking status: Never Smoker   • Smokeless tobacco: Never Used   Substance Use Topics   • Alcohol use: Never     Frequency: Never   • Drug use: No       Current Outpatient Medications Ordered in Epic   Medication Sig Dispense Refill   • pantoprazole (PROTONIX) 40 MG Tablet Delayed Response      • polyethylene glycol 3350 (MIRALAX) Powder      • famotidine (PEPCID) 40 MG Tab Take 1 Tab by mouth every day. 60 Tab 3   • cetirizine (ZYRTEC) 10 MG Tab Take 10 mg by mouth every day.     • metroNIDAZOLE (FLAGYL) 500 MG Tab        No current Epic-ordered facility-administered medications on file.        Allergies:  Patient has no known allergies.    Health Maintenance: Completed    ROS:  Gen: no fevers/chills  Eyes: no changes in vision  ENT: no sore throat, no hearing loss, no bloody nose  Pulm: no sob, no cough  CV: no chest pain, no palpitations  GI: no  "nausea/vomiting, no diarrhea    Objective:       Exam:  /78 (BP Location: Left arm, Patient Position: Sitting, BP Cuff Size: Adult)   Pulse 64   Temp 37.1 °C (98.7 °F) (Temporal)   Resp 16   Ht 1.702 m (5' 7\")   Wt 74.3 kg (163 lb 12.8 oz)   SpO2 93%   BMI 25.65 kg/m²  Body mass index is 25.65 kg/m².    Gen: Alert and oriented, No apparent distress.  HENT: TMs are clear. Oropharynx is w/o erythema or exudates. Neck is supple without cervical lymphadenopathy. No JVD.   Eyes: PERRLA  Lungs: Normal effort, CTA bilaterally, no wheezes, rhonchi, or rales  CV: Regular rate and rhythm. No murmurs, rubs, or gallops.  Abdomen: Soft, nontender, nondistended. Normal bowel sounds. Liver and spleen are not palpable  Ext: No clubbing, cyanosis, edema.  Skin: no rash, lesions or ulcers  Neuro: Moves all extremities.  Psych: AAOx3      Assessment & Plan:     29 y.o. male with the following -     1. Bloated abdomen  2. Erosive esophagitis  3. Other gastritis without hemorrhage, unspecified chronicity  This is a chronic, unchanged condition.  The patient presents today reporting that he still has persistence of belching and sensation of a bloated abdomen.  He had addressed this to his GI specialist and is status post EGD and colonoscopy.  Findings were suggestive of erosive esophagitis and ulcerative colitis.  It is very possible that both these findings could be contributing to his persistent belching.  He is in the process of starting mesalamine for ulcerative colitis.  I mentioned that it would be a good idea to start mesalamine instead of sulfasalazine as mesalamine is better tolerated.  If mesalamine resolves his belching, then ulcerative colitis is more likely to be contributing to his symptoms.  However, if mesalamine does not help with the bulging it would be a good idea to start the patient on Protonix to see if this does help given the fact that he does have erosive esophagitis with gastritis.  The patient is " agreeable to plan.    4. Other ulcerative colitis with complication (HCC)  This is a new problem.  The patient had endorsed a brief history of bloody stools a few months ago and I sent him to GI for further evaluation.  He is status post colonoscopy and though his colon appeared normal on gross visualization, he did test positive for ulcerative colitis at the microscopic level.  He denies any acute flares at this time.  - Start mesalamine orally  -Continue to follow-up with GI    Return in about 4 months (around 4/23/2020) for FU on belching, UC.    Please note that this dictation was created using voice recognition software. I have made every reasonable attempt to correct obvious errors, but I expect that there are errors of grammar and possibly content that I did not discover before finalizing the note.

## 2019-12-23 NOTE — ASSESSMENT & PLAN NOTE
This is a chronic condition.   He had bloody stools and I referred to GI. The patient is s/p colonoscopy and was found to have UC.   He denies any acute bloody stools at this time.   He is in the process of starting mesalamine.

## 2020-04-23 ENCOUNTER — APPOINTMENT (OUTPATIENT)
Dept: MEDICAL GROUP | Facility: PHYSICIAN GROUP | Age: 30
End: 2020-04-23
Payer: COMMERCIAL

## 2020-05-04 ENCOUNTER — APPOINTMENT (OUTPATIENT)
Dept: MEDICAL GROUP | Facility: PHYSICIAN GROUP | Age: 30
End: 2020-05-04
Payer: COMMERCIAL

## 2020-11-23 ENCOUNTER — PATIENT MESSAGE (OUTPATIENT)
Dept: MEDICAL GROUP | Facility: PHYSICIAN GROUP | Age: 30
End: 2020-11-23

## 2020-11-23 DIAGNOSIS — Z20.822 EXPOSURE TO COVID-19 VIRUS: ICD-10-CM

## 2020-11-24 ENCOUNTER — HOSPITAL ENCOUNTER (OUTPATIENT)
Dept: LAB | Facility: MEDICAL CENTER | Age: 30
End: 2020-11-24
Attending: FAMILY MEDICINE
Payer: COMMERCIAL

## 2020-11-24 DIAGNOSIS — Z20.822 EXPOSURE TO COVID-19 VIRUS: ICD-10-CM

## 2020-11-24 LAB — COVID ORDER STATUS COVID19: NORMAL

## 2020-11-24 PROCEDURE — U0003 INFECTIOUS AGENT DETECTION BY NUCLEIC ACID (DNA OR RNA); SEVERE ACUTE RESPIRATORY SYNDROME CORONAVIRUS 2 (SARS-COV-2) (CORONAVIRUS DISEASE [COVID-19]), AMPLIFIED PROBE TECHNIQUE, MAKING USE OF HIGH THROUGHPUT TECHNOLOGIES AS DESCRIBED BY CMS-2020-01-R: HCPCS

## 2020-11-24 PROCEDURE — C9803 HOPD COVID-19 SPEC COLLECT: HCPCS

## 2020-11-25 LAB
SARS-COV-2 RNA RESP QL NAA+PROBE: NOTDETECTED
SPECIMEN SOURCE: NORMAL

## 2021-06-10 ENCOUNTER — OFFICE VISIT (OUTPATIENT)
Dept: MEDICAL GROUP | Facility: PHYSICIAN GROUP | Age: 31
End: 2021-06-10
Payer: COMMERCIAL

## 2021-06-10 ENCOUNTER — HOSPITAL ENCOUNTER (OUTPATIENT)
Dept: LAB | Facility: MEDICAL CENTER | Age: 31
End: 2021-06-10
Attending: FAMILY MEDICINE
Payer: COMMERCIAL

## 2021-06-10 VITALS
WEIGHT: 177.6 LBS | HEIGHT: 68 IN | HEART RATE: 82 BPM | BODY MASS INDEX: 26.92 KG/M2 | TEMPERATURE: 99.2 F | RESPIRATION RATE: 15 BRPM | OXYGEN SATURATION: 99 % | SYSTOLIC BLOOD PRESSURE: 118 MMHG | DIASTOLIC BLOOD PRESSURE: 70 MMHG

## 2021-06-10 DIAGNOSIS — R20.0 NUMBNESS AND TINGLING: ICD-10-CM

## 2021-06-10 DIAGNOSIS — Z82.0 FAMILY HISTORY OF MS (MULTIPLE SCLEROSIS): ICD-10-CM

## 2021-06-10 DIAGNOSIS — R20.2 NUMBNESS AND TINGLING: ICD-10-CM

## 2021-06-10 LAB
ERYTHROCYTE [DISTWIDTH] IN BLOOD BY AUTOMATED COUNT: 38.8 FL (ref 35.9–50)
EST. AVERAGE GLUCOSE BLD GHB EST-MCNC: 103 MG/DL
HBA1C MFR BLD: 5.2 % (ref 4–5.6)
HCT VFR BLD AUTO: 43.3 % (ref 42–52)
HGB BLD-MCNC: 14.5 G/DL (ref 14–18)
MCH RBC QN AUTO: 28.4 PG (ref 27–33)
MCHC RBC AUTO-ENTMCNC: 33.5 G/DL (ref 33.7–35.3)
MCV RBC AUTO: 84.9 FL (ref 81.4–97.8)
PLATELET # BLD AUTO: 246 K/UL (ref 164–446)
PMV BLD AUTO: 10.8 FL (ref 9–12.9)
RBC # BLD AUTO: 5.1 M/UL (ref 4.7–6.1)
VIT B12 SERPL-MCNC: 451 PG/ML (ref 211–911)
WBC # BLD AUTO: 5 K/UL (ref 4.8–10.8)

## 2021-06-10 PROCEDURE — 85027 COMPLETE CBC AUTOMATED: CPT

## 2021-06-10 PROCEDURE — 82607 VITAMIN B-12: CPT

## 2021-06-10 PROCEDURE — 99214 OFFICE O/P EST MOD 30 MIN: CPT | Performed by: FAMILY MEDICINE

## 2021-06-10 PROCEDURE — 36415 COLL VENOUS BLD VENIPUNCTURE: CPT

## 2021-06-10 PROCEDURE — 83036 HEMOGLOBIN GLYCOSYLATED A1C: CPT

## 2021-06-10 RX ORDER — M-VIT,TX,IRON,MINS/CALC/FOLIC 27MG-0.4MG
1 TABLET ORAL DAILY
COMMUNITY
End: 2022-02-14

## 2021-06-10 RX ORDER — MESALAMINE 1.2 G/1
1.2 TABLET, DELAYED RELEASE ORAL 2 TIMES DAILY
COMMUNITY
Start: 2021-03-26

## 2021-06-10 ASSESSMENT — FIBROSIS 4 INDEX: FIB4 SCORE: 0.46

## 2021-06-10 ASSESSMENT — PATIENT HEALTH QUESTIONNAIRE - PHQ9: CLINICAL INTERPRETATION OF PHQ2 SCORE: 0

## 2021-06-10 NOTE — PROGRESS NOTES
"Subjective:     Chief Complaint   Patient presents with   • Annual Exam       HPI:   Ethan presents today to discuss the following.      Numbness and tingling  Chronic issue  Has had numbness and tingling to his fingertips   Occurs mostly in the morning and goes away when waking up. Intermittent   Might also have some weakness to his upper arms and some balance concerns   There is a family hx of MS and this concerns him  -denies any neck pain  -he does work out but denies any symptoms while working out  -denies any acute vision loss      Past Medical History:   Diagnosis Date   • Kidney stone    • Nephrolithiasis        Current Outpatient Medications Ordered in Epic   Medication Sig Dispense Refill   • mesalamine (LIALDA) 1.2 GM Tablet Delayed Response Take 1.2 mg by mouth every day.     • therapeutic multivitamin-minerals (THERAGRAN-M) Tab Take 1 tablet by mouth every day.     • cetirizine (ZYRTEC) 10 MG Tab Take 10 mg by mouth every day.       No current [facility]-ordered facility-administered medications on file.       Allergies:  Patient has no known allergies.    Health Maintenance: Completed    ROS:  Gen: no fevers/chills, no changes in weight  Eyes: no changes in vision  Pulm: no sob, no cough  CV: no chest pain, no palpitations  GI: no nausea/vomiting, no diarrhea      Objective:     Exam:  /70 (BP Location: Left arm, Patient Position: Sitting, BP Cuff Size: Adult)   Pulse 82   Temp 37.3 °C (99.2 °F) (Temporal)   Resp 15   Ht 1.715 m (5' 7.5\")   Wt 80.6 kg (177 lb 9.6 oz)   SpO2 99%   BMI 27.41 kg/m²  Body mass index is 27.41 kg/m².        Constitutional: Alert, no distress, well-groomed.  Skin: Warm, dry, good turgor, no rashes in visible areas.  Eye: Equal, round and reactive, conjunctiva clear, lids normal.  ENMT: Lips without lesions, good dentition, moist mucous membranes.  Neck: Trachea midline, no masses, no thyromegaly.  Respiratory: Unlabored respiratory effort, no cough.  MSK: Normal gait, " moves all extremities.  Negative Spurling test bilaterally.  Neuro: Grossly non-focal.   Psych: Alert and oriented x3, normal affect and mood.        Assessment & Plan:     31 y.o. male with the following -     1. Numbness and tingling  2. Family history of MS (multiple sclerosis)  This is a new problem.  Over the past 9 months the patient has been complaining of bilateral finger numbness and tingling that happens mostly at night.  Etiology is not entirely clear.  However, upon further questioning he does endorse a family history of multiple sclerosis.  His mother unfortunately had this diagnosis.  He denies any neck pain or heavy workouts.  Negative Spurling test in the office today.  I would like to order blood work to rule out any secondary cause of neuropathy.  I will refer to neurology for further evaluation.  Close follow-up in 3 months.  - REFERRAL TO NEUROLOGY  - CBC WITHOUT DIFFERENTIAL; Future  - HEMOGLOBIN A1C; Future  - VITAMIN B12; Future      No follow-ups on file.    Please note that this dictation was created using voice recognition software. I have made every reasonable attempt to correct obvious errors, but I expect that there are errors of grammar and possibly content that I did not discover before finalizing the note.

## 2021-06-10 NOTE — ASSESSMENT & PLAN NOTE
Chronic issue  Has had numbness and tingling to his fingertips   Occurs mostly in the morning and goes away when waking up. Intermittent   Might also have some weakness to his upper arms and some balance concerns   There is a family hx of MS and this concerns him  -denies any neck pain  -he does work out but denies any symptoms while working out  -denies any acute vision loss

## 2021-08-18 ENCOUNTER — OFFICE VISIT (OUTPATIENT)
Dept: NEUROLOGY | Facility: MEDICAL CENTER | Age: 31
End: 2021-08-18
Attending: PSYCHIATRY & NEUROLOGY
Payer: COMMERCIAL

## 2021-08-18 ENCOUNTER — APPOINTMENT (OUTPATIENT)
Dept: LAB | Facility: MEDICAL CENTER | Age: 31
End: 2021-08-18
Attending: PSYCHIATRY & NEUROLOGY
Payer: COMMERCIAL

## 2021-08-18 VITALS
BODY MASS INDEX: 26.26 KG/M2 | HEIGHT: 68 IN | DIASTOLIC BLOOD PRESSURE: 68 MMHG | HEART RATE: 71 BPM | SYSTOLIC BLOOD PRESSURE: 118 MMHG | WEIGHT: 173.28 LBS | OXYGEN SATURATION: 98 %

## 2021-08-18 DIAGNOSIS — R20.2 PARESTHESIAS: Primary | ICD-10-CM

## 2021-08-18 PROCEDURE — 99205 OFFICE O/P NEW HI 60 MIN: CPT | Performed by: PSYCHIATRY & NEUROLOGY

## 2021-08-18 PROCEDURE — 99211 OFF/OP EST MAY X REQ PHY/QHP: CPT | Performed by: PSYCHIATRY & NEUROLOGY

## 2021-08-18 ASSESSMENT — ENCOUNTER SYMPTOMS
FOCAL WEAKNESS: 0
TREMORS: 0
TINGLING: 1
NECK PAIN: 0
BLURRED VISION: 0
DOUBLE VISION: 0
CONSTIPATION: 0
MYALGIAS: 0
MEMORY LOSS: 0

## 2021-08-18 ASSESSMENT — FIBROSIS 4 INDEX: FIB4 SCORE: 0.47

## 2021-08-18 NOTE — PROGRESS NOTES
Subjective     Ethan Gerber is a 31 y.o. male who presents for consultation, from the office of Dr. Bhanu Gaspar MD, for consultation, with a history of paresthesias in the upper extremities.     HPI    Mr. Gerber is a very pleasant 31-year-old right-handed gentleman who symptoms started relatively quickly back in October, 2020, or thereabouts.  He simply remembers the right upper extremity, especially the hand, as tingling.  Symptoms always seem to be worse in the early morning hours, he would get up, move the extremity itself, symptoms seem to improve.  Over the next week or so, everything seemed to worsen.  He began to notice the right upper extremities becoming heavy at nighttime, but again waking up in the morning and moving around, symptoms resolved.  Throughout the day they never recurred, he never lost strength.  Working out on a routine basis, was never affected.    In early 2021, the left hand became involved in similar fashion though they remained in the hand only.  At this time the sensory distortions had radiated in the right upper extremity in the C7/C8 distribution pattern.  Symptoms bilaterally would change either simultaneously, they could also fluctuate independently of the other.    Though the upper extremity could feel heavy at times, he denied any issues with generalized fatigue, visual changes including double vision or monocular visual loss, swallowing difficulties, change in voice quality, neck pain with radiating symptoms into the upper extremities or down the spinal axis, cognitive impairment, sleep distortions, change in bowel or bladder function, or significant involvement of the lower extremities.  Balance was never an issue.    In the spring of this year, he had 2 or 3 episodes of sudden vertigo, independent of the sensory distortions described above.  Spontaneous in onset, they would last anywhere from 1-2 hours with resolution.  There were no other associated symptoms.   "The last occurred in May, 2021.  Since  of this year, all of his symptoms essentially have resolved.    When symptoms had started, he denied any change in his daily routines, he had been working out at the gym regularly, he had obtained a cervical pillow which he has continued to use despite resolution of his symptoms.  There has been no directed work-up, treatments obviously have not been provided.    He has a history of ulcerative colitis and urolithiasis.  There is no history of MS, neurodegenerative disease, other autoimmune disease, hypertension, diabetes, thyroid disease, CVA, CAD, PVD, migraine or seizures.  There is no surgical history of note.    His mother  at 44 because of lung cancer and complications of what sounds like was tumefactive MS.  His father and sister are both alive and well.  He has 2 cousins on his mother side of the family with MS.  His 5 and 3-year-old children are alive and well.    He does not smoke or drink, denies MSA.  Other than Zyrtec and a multivitamin, he is on mesalamine 1.2 mg daily.    Review of Systems   Constitutional: Negative for malaise/fatigue.   Eyes: Negative for blurred vision and double vision.   Gastrointestinal: Negative for constipation.   Musculoskeletal: Negative for myalgias and neck pain.   Skin: Negative for rash.   Neurological: Positive for tingling. Negative for tremors and focal weakness.   Psychiatric/Behavioral: Negative for memory loss.   All other systems reviewed and are negative.    Objective     /68 (BP Location: Left arm, Patient Position: Sitting)   Pulse 71   Ht 1.727 m (5' 8\")   Wt 78.6 kg (173 lb 4.5 oz)   SpO2 98%   BMI 26.35 kg/m²      Physical Exam    He appears in no acute distress.  His vital signs are stable.  There is no malar rash, jaw claudication, or allodynia.  His neck is supple, range of motion is full.  Spinous processes are nontender, there is no spasm of the paraspinal muscle bodies bilaterally.  Lhermitte's " phenomena is absent, compression maneuvers are negative bilaterally.  There is no tenderness at the elbows or wrists, Tinel and Phalen signs are absent bilaterally.  Straight leg raising is negative bilaterally.  There is no evidence of rash, or diffuse muscle and joint tenderness and swelling.     Neurological Exam    Fully oriented, there is no aphasia, apraxia, or inattention.    PERRLA/EOMI, visual fields are full to finger counting on confrontation bilaterally, funduscopic exam on the right revealed a sharp disc margin, I could not visualize the fundus on the left.  There is no evidence of pallor on the right.  Facial movements are symmetric, sensory exam is intact to temperature and pinprick bilaterally.  The tongue and uvula are midline, there is no bulbar dysfunction.  Shoulder shrug and head rotation are intact and symmetric.    Musculoskeletal exam reveals normal tone bilaterally, there is no tremor, asterixis, or drift.  Strength is 5/5 in all 4 extremities.  Reflexes are brisk throughout, there are no asymmetries, none are dropped.  Both toes are downgoing.    He stands easily, armswing is symmetric, gait is normal and station and stride.  There is no appendicular dystaxia with any of the extremities.  Repetitive movements and fine motor control are also normal and symmetric bilaterally with maintained amplitude and frequencies.    Sensory exam is intact to temperature, pinprick, JPS and vibration.  There is no spinal cord sensory level to temperature, pinprick or vibration.  Romberg is absent.    Assessment & Plan     1. Paresthesias  Fortunately, his exam is normal, but the presentation suggest the possibility of both central as well as peripheral nervous system origin.  The relatively rapid onset and fluctuating course, with slow resolution spontaneously, is consistent with demyelinating disease.  Myelopathy of other cause could be considered as well.  Imaging of the cervical spine is the first step,  depending on these results CSF studies might be required (if imaging is abnormal), and MRI imaging of the brain (especially if MRI imaging of the cervical spine is negative/normal).  Blood work for medical illnesses with symptomatic nervous system involvement including other autoimmune disease, vitamin deficiency state, etc. also need to be obtained.  Depending on these results further serologies could then be assessed.    A peripheral nervous system involvement might be considered though again his exam would be inconsistent with AIDP or even CIDP.  This is not a hereditary issue, most of these are in a sending picture, more chronic in nature, and the examination findings would be abnormal.  For now I would hold on EMG/NCV studies depending on the results of the initial work-up.    A long time was spent talking about his symptoms, the differential diagnosis, the rationale for diagnostics being done at this time, follow-up studies, etc.  He was encouraged to remain active.  He knows to call the office if his symptoms were to recur, especially if they are more persistent and severe, and associated with other, new symptoms.  We will follow-up otherwise after the tests are done, we will call them earlier if the abnormalities are seen and which may warrant more urgent investigations.    - MR-CERVICAL SPINE-WITH & W/O; Future  - VIT B12,  FOLIC ACID  - COPPER, SERUM; Future  - Sed Rate; Future  - SHYLA COMPREHENSIVE PANEL  - SJOGREN'S AB, ANTI-SS-A/-SS-B  - VITAMIN B1; Future    Time: 60 minutes spent face-to-face for exam, review, discussion, and education, of this over 50% of the time spent counseling and coordinating care.

## 2021-09-17 ENCOUNTER — HOSPITAL ENCOUNTER (OUTPATIENT)
Dept: RADIOLOGY | Facility: MEDICAL CENTER | Age: 31
End: 2021-09-17
Attending: PSYCHIATRY & NEUROLOGY
Payer: COMMERCIAL

## 2021-09-17 DIAGNOSIS — R20.2 PARESTHESIAS: ICD-10-CM

## 2021-09-17 PROCEDURE — 72156 MRI NECK SPINE W/O & W/DYE: CPT

## 2021-09-17 PROCEDURE — 700117 HCHG RX CONTRAST REV CODE 255: Performed by: PSYCHIATRY & NEUROLOGY

## 2021-09-17 PROCEDURE — A9576 INJ PROHANCE MULTIPACK: HCPCS | Performed by: PSYCHIATRY & NEUROLOGY

## 2021-09-17 RX ADMIN — GADOTERIDOL 15 ML: 279.3 INJECTION, SOLUTION INTRAVENOUS at 10:16

## 2021-09-30 LAB
CENTROMERE B AB SER-ACNC: <0.2 AI (ref 0–0.9)
CHROMATIN AB SERPL-ACNC: <0.2 AI (ref 0–0.9)
COPPER SERPL-MCNC: 83 UG/DL (ref 69–132)
DSDNA AB SER-ACNC: <1 IU/ML (ref 0–9)
ENA JO1 AB SER-ACNC: <0.2 AI (ref 0–0.9)
ENA RNP AB SER-ACNC: <0.2 AI (ref 0–0.9)
ENA SCL70 AB SER-ACNC: <0.2 AI (ref 0–0.9)
ENA SM AB SER-ACNC: <0.2 AI (ref 0–0.9)
ENA SS-A AB SER-ACNC: <0.2 AI (ref 0–0.9)
ENA SS-B AB SER-ACNC: <0.2 AI (ref 0–0.9)
ERYTHROCYTE [SEDIMENTATION RATE] IN BLOOD BY WESTERGREN METHOD: 6 MM/HR (ref 0–15)
FOLATE SERPL-MCNC: 16.9 NG/ML
SEE BELOW  164869: NORMAL
VIT B12 SERPL-MCNC: 469 PG/ML (ref 232–1245)

## 2021-10-22 ENCOUNTER — APPOINTMENT (OUTPATIENT)
Dept: NEUROLOGY | Facility: MEDICAL CENTER | Age: 31
End: 2021-10-22
Payer: COMMERCIAL

## 2021-11-18 ENCOUNTER — OFFICE VISIT (OUTPATIENT)
Dept: NEUROLOGY | Facility: MEDICAL CENTER | Age: 31
End: 2021-11-18
Attending: PSYCHIATRY & NEUROLOGY
Payer: COMMERCIAL

## 2021-11-18 VITALS
OXYGEN SATURATION: 97 % | SYSTOLIC BLOOD PRESSURE: 124 MMHG | DIASTOLIC BLOOD PRESSURE: 84 MMHG | HEART RATE: 77 BPM | RESPIRATION RATE: 12 BRPM | WEIGHT: 174.6 LBS | HEIGHT: 68 IN | BODY MASS INDEX: 26.46 KG/M2 | TEMPERATURE: 97.5 F

## 2021-11-18 DIAGNOSIS — R20.2 NUMBNESS AND TINGLING: ICD-10-CM

## 2021-11-18 DIAGNOSIS — R20.0 NUMBNESS AND TINGLING: ICD-10-CM

## 2021-11-18 PROCEDURE — 99211 OFF/OP EST MAY X REQ PHY/QHP: CPT | Performed by: PSYCHIATRY & NEUROLOGY

## 2021-11-18 PROCEDURE — 99213 OFFICE O/P EST LOW 20 MIN: CPT | Performed by: PSYCHIATRY & NEUROLOGY

## 2021-11-18 NOTE — PROGRESS NOTES
"Naldo Gerber is a 31 y.o. male who presents for follow-up, with a history of bilateral upper extremity paresthesias, now resolved since his office visit in August of this year, and who is status post MRI imaging of the cervical spine and blood work.    TIMUR Moreno states that the sensory symptoms he had in the upper extremities actually have not recurred.  Even the a.m. heaviness in the left arm has stopped completely.  He denies any episodes of visual distortion, cognitive impairment, increased generalized fatigue, or focal motor and cerebellar distortions.  In this degree he is quite happy.    MRI the cervical spine was completely normal.  Blood work including basic screens for autoimmune, paraneoplastic, vitamin deficiency and paraproteinemia conditions all proved unremarkable.    Medical, surgical and family histories are reviewed, there are no new drug allergies.  He is taking no medications other than mesalamine and a multivitamin.    Review of Systems   All other systems reviewed and are negative.    Objective     /84 (BP Location: Left arm, Patient Position: Sitting, BP Cuff Size: Adult)   Pulse 77   Temp 36.4 °C (97.5 °F) (Temporal)   Resp 12   Ht 1.727 m (5' 8\")   Wt 79.2 kg (174 lb 9.7 oz)   SpO2 97%   BMI 26.55 kg/m²      Physical Exam    He appears in no acute distress.  Vital signs are stable.  He is very pleasant in spirit and demeanor, as always very cooperative.  There is no malar rash or jaw claudication.  The neck is supple, range of motion is full.  Cardiac evaluation reveals a regular rhythm.     Neurological Exam    Including mental status, cranial nerves, funduscopic, strength, reflex, coordination, and sensory evaluations, his full neurologic examination is done and reveals no evidence of deficits or toxicities, all unchanged from his exam 3 months ago.    Assessment & Plan     1. Numbness and tingling  Clinically stable, his examinations also remain " normal.  For thoroughness, I will obtain an MRI scan of the brain as well as EMG/NCV studies to rule out something more subtle and difficult to rule out based purely on clinical examination.  I suspect he will do well, if these tests are negative/normal, we can simply follow-up in the future as needed.  He was told that the symptoms he has had may fluctuate on their own, but as long as they do not progress, behaving as they have historically, he need not worry.  We did spend some time talking about the signs and symptoms and episodes that could suggest some underlying CNS or PNS process moving forwards, and in that circumstance he will call the office.  At that point we may need to repeat imaging, consider CSF studies, etc.  He was told that he can continue with his life without constraint.    - Referral to Neurodiagnostics (EEG,EP,EMG/NCS/DBS)  - MR-BRAIN-WITH & W/O; Future    Time: 20 minutes in total spent on patient care for precharting, record review, discussions with healthcare staff and documentation.  This included face-to-face time with the patient for exam, review, discussion, education, counseling and coordinating care.

## 2021-12-05 ENCOUNTER — HOSPITAL ENCOUNTER (OUTPATIENT)
Dept: RADIOLOGY | Facility: MEDICAL CENTER | Age: 31
End: 2021-12-05
Attending: PSYCHIATRY & NEUROLOGY
Payer: COMMERCIAL

## 2021-12-05 DIAGNOSIS — R20.2 NUMBNESS AND TINGLING: ICD-10-CM

## 2021-12-05 DIAGNOSIS — R20.0 NUMBNESS AND TINGLING: ICD-10-CM

## 2021-12-05 PROCEDURE — 70553 MRI BRAIN STEM W/O & W/DYE: CPT

## 2021-12-05 PROCEDURE — 700117 HCHG RX CONTRAST REV CODE 255: Performed by: PSYCHIATRY & NEUROLOGY

## 2021-12-05 PROCEDURE — A9576 INJ PROHANCE MULTIPACK: HCPCS | Performed by: PSYCHIATRY & NEUROLOGY

## 2021-12-05 RX ADMIN — GADOTERIDOL 15 ML: 279.3 INJECTION, SOLUTION INTRAVENOUS at 08:44

## 2021-12-16 ENCOUNTER — NON-PROVIDER VISIT (OUTPATIENT)
Dept: NEUROLOGY | Facility: MEDICAL CENTER | Age: 31
End: 2021-12-16
Attending: PSYCHIATRY & NEUROLOGY
Payer: COMMERCIAL

## 2021-12-16 DIAGNOSIS — R20.2 PARESTHESIAS: ICD-10-CM

## 2021-12-16 PROCEDURE — 95886 MUSC TEST DONE W/N TEST COMP: CPT | Mod: 26 | Performed by: PSYCHIATRY & NEUROLOGY

## 2021-12-16 PROCEDURE — 95912 NRV CNDJ TEST 11-12 STUDIES: CPT | Mod: 26 | Performed by: PSYCHIATRY & NEUROLOGY

## 2021-12-16 PROCEDURE — 95912 NRV CNDJ TEST 11-12 STUDIES: CPT | Performed by: PSYCHIATRY & NEUROLOGY

## 2021-12-16 PROCEDURE — 95886 MUSC TEST DONE W/N TEST COMP: CPT | Performed by: PSYCHIATRY & NEUROLOGY

## 2021-12-16 NOTE — PROCEDURES
"NERVE CONDUCTION STUDIES AND ELECTROMYOGRAPHY REPORT  [facility]  12/16/21          IMPRESSION:  This is a normal study.  There is no electrophysiologic evidence of bilateral median neuropathy at the wrists (carpal tunnel syndrome) or bilateral cervical radiculopathy.  Consider repeat testing if progressive symptoms.      Alejandra Burden MD  Neurology - Neurophysiology        REASON FOR REFERRAL:  Mr. Ethan Saunders Green 31 y.o. referred by Dr. Lopez Paulino for an evaluation of sensory disturbance.  Patient had a relatively abrupt onset tingling in the right upper extremity worse in the morning hours improved with movement.  Within a week, symptoms became more persistent.  Repeat episode in early 2021 with abnormal sensations in the right C7/8 dermatome.  This is associated with heaviness of the arm.  This is an evaluation for cervical radiculopathy versus focal neuropathy.    Height: 5'7.5\"  Weight: 170 lbs    Symptom focused neurological exam shows normal strength and sensation to light touch in the bilateral upper extremities.      ELECTRODIAGNOSTIC EXAMINATION:  Nerve conduction studies (NCS) and electromyography (EMG) are utilized to evaluate direct or indirect damage to the peripheral nervous system. NCS are performed to measure the nerve(s) response(s) to electrostimulation across a given nerve segment. EMG evaluates the passive and active electrical activity of the muscle(s) in question.  Muscles are innervated by specific peripheral nerves and roots. Often times, several nerves the muscle to be examined in order to determine the presence or absence of the disease process. Furthermore, nerves and muscles may need to be tested in a [id number] comparison, as well as in additional extremities, as this may be crucial in characterizing the extent of the disease process, which may be diffuse or isolated and of varying degree of severity. The extent of the neurodiagnostic exam is " justified as it may help arrive to a proper diagnosis, which ultimately may contribute to better management of the patient. Therefore, the nerves to muscles examined during the study were medically necessary.    Unless otherwise noted, temperature of the extremity(s) study was monitored before and during the examination and remained between 32 and 36 degrees C for the upper extremities, and between 30 and 36 degrees C for the lower extremities. The patient tolerated testing well, without any complications.       NERVE CONDUCTION STUDY SUMMARY:  Selected nerves of the bilateral upper extremity are studied.    Normal bilateral median sensory and motor responses.    Normal bilateral ulnar sensory and motor responses.  Normal bilateral median/ulnar palmar comparisons.    NEEDLE EMG SUMMARY:  Concentric needle study of selected bilateral upper extremity muscles is performed.     Insertion activity is normal in all muscles sampled.   With activation, there are normal morphology (amplitude/duration) motor unit action potentials firing with normal recruitment in muscles tested.       PATIENT DATA TABLES  Nerve Conduction Studies     Stim Site NR Onset (ms) Norm Onset (ms) O-P Amp (µV) Norm O-P Amp Site1 Site2 Delta-P (ms) Dist (cm) John (m/s) Norm John (m/s)   Left Median Anti Sensory (2nd Digit)   Wrist    1.9 <3.4 91.9 >20 Wrist 2nd Digit 2.8 14.0 50 >50   Right Median Anti Sensory (2nd Digit)   Wrist    1.9 <3.4 68.5 >20 Wrist 2nd Digit 2.5 14.0 56 >50   Left Ulnar Anti Sensory (5th Digit)   Wrist    2.1 <3.1 70.2 >12 Wrist 5th Digit 2.8 14.0 50 >50   Right Ulnar Anti Sensory (5th Digit)   Wrist    2.0 <3.1 49.1 >12 Wrist 5th Digit 2.7 14.0 52 >50        Stim Site NR Onset (ms) Norm Onset (ms) O-P Amp (mV) Norm O-P Amp Site1 Site2 Delta-0 (ms) Dist (cm) John (m/s) Norm John (m/s)   Left Median Motor (Abd Poll Brev)   Wrist    3.1 <3.9 11.6 >6 Elbow Wrist 3.9 24.0 62 >50   Elbow    7.0  11.7          Right Median Motor (Abd  Poll Brev)   Wrist    2.9 <3.9 12.7 >6 Elbow Wrist 4.1 25.0 61 >50   Elbow    7.0  11.9          Left Ulnar Motor (Abd Dig Min)   Wrist    3.0 <3.1 10.0 >7 B Elbow Wrist 2.9 19.0 66 >50   B Elbow    5.9  10.0  A Elbow B Elbow 1.4 10.0 71    A Elbow    7.3  10.0          Right Ulnar Motor (Abd Dig Min)   Wrist    2.6 <3.1 8.9 >7 B Elbow Wrist 2.9 20.0 69 >50   B Elbow    5.5  7.8  A Elbow B Elbow 1.4 10.0 71    A Elbow    6.9  7.6               Stim Site NR Peak (ms) Norm Peak (ms) P-T Amp (µV) Site1 Site2 Delta-P (ms) Norm Delta (ms)   Left Median/Ulnar Palm Comparison (Wrist - 8cm)   Median Palm    1.5 <2.3 53.3 Median Palm Ulnar Palm 0.3 <0.3   Ulnar Palm    1.8 <2.3 68.6       Right Median/Ulnar Palm Comparison (Wrist - 8cm)   Median Palm    1.7 <2.3 81.0 Median Palm Ulnar Palm 0.2 <0.3   Ulnar Palm    1.9 <2.3 32.9                                   Electromyography     Side Muscle Nerve Root Ins Act Fibs Psw Amp Dur Poly Recrt Int Pat Comment   Right 1stDorInt Ulnar C8-T1 Nml Nml Nml Nml Nml 0 Nml Nml    Right PronatorTeres Median C6-7 Nml Nml Nml Nml Nml 0 Nml Nml    Right Biceps Musculocut C5-6 Nml Nml Nml Nml Nml 0 Nml Nml    Right Triceps Radial C6-7-8 Nml Nml Nml Nml Nml 0 Nml Nml    Right Deltoid Axillary C5-6 Nml Nml Nml Nml Nml 0 Nml Nml    Left 1stDorInt Ulnar C8-T1 Nml Nml Nml Nml Nml 0 Nml Nml    Left PronatorTeres Median C6-7 Nml Nml Nml Nml Nml 0 Nml Nml    Left Biceps Musculocut C5-6 Nml Nml Nml Nml Nml 0 Nml Nml    Left Triceps Radial C6-7-8 Nml Nml Nml Nml Nml 0 Nml Nml    Left Deltoid Axillary C5-6 Nml Nml Nml Nml Nml 0 Nml Nml

## 2022-02-14 ENCOUNTER — HOSPITAL ENCOUNTER (OUTPATIENT)
Facility: MEDICAL CENTER | Age: 32
End: 2022-02-14
Attending: EMERGENCY MEDICINE
Payer: COMMERCIAL

## 2022-02-14 ENCOUNTER — PATIENT MESSAGE (OUTPATIENT)
Dept: MEDICAL GROUP | Facility: PHYSICIAN GROUP | Age: 32
End: 2022-02-14

## 2022-02-14 ENCOUNTER — OFFICE VISIT (OUTPATIENT)
Dept: URGENT CARE | Facility: PHYSICIAN GROUP | Age: 32
End: 2022-02-14
Payer: COMMERCIAL

## 2022-02-14 ENCOUNTER — HOSPITAL ENCOUNTER (OUTPATIENT)
Dept: RADIOLOGY | Facility: MEDICAL CENTER | Age: 32
End: 2022-02-14
Attending: EMERGENCY MEDICINE
Payer: COMMERCIAL

## 2022-02-14 VITALS
OXYGEN SATURATION: 96 % | SYSTOLIC BLOOD PRESSURE: 138 MMHG | DIASTOLIC BLOOD PRESSURE: 82 MMHG | RESPIRATION RATE: 18 BRPM | HEART RATE: 104 BPM | TEMPERATURE: 99.6 F | HEIGHT: 68 IN | BODY MASS INDEX: 27.28 KG/M2 | WEIGHT: 180 LBS

## 2022-02-14 DIAGNOSIS — R05.9 COUGH: ICD-10-CM

## 2022-02-14 DIAGNOSIS — J20.9 ACUTE BRONCHITIS WITH BRONCHOSPASM: ICD-10-CM

## 2022-02-14 DIAGNOSIS — J01.90 ACUTE RHINOSINUSITIS: ICD-10-CM

## 2022-02-14 PROCEDURE — U0003 INFECTIOUS AGENT DETECTION BY NUCLEIC ACID (DNA OR RNA); SEVERE ACUTE RESPIRATORY SYNDROME CORONAVIRUS 2 (SARS-COV-2) (CORONAVIRUS DISEASE [COVID-19]), AMPLIFIED PROBE TECHNIQUE, MAKING USE OF HIGH THROUGHPUT TECHNOLOGIES AS DESCRIBED BY CMS-2020-01-R: HCPCS

## 2022-02-14 PROCEDURE — 99213 OFFICE O/P EST LOW 20 MIN: CPT | Mod: CS | Performed by: EMERGENCY MEDICINE

## 2022-02-14 PROCEDURE — U0005 INFEC AGEN DETEC AMPLI PROBE: HCPCS

## 2022-02-14 PROCEDURE — 71046 X-RAY EXAM CHEST 2 VIEWS: CPT

## 2022-02-14 RX ORDER — BENZONATATE 100 MG/1
100 CAPSULE ORAL 3 TIMES DAILY PRN
Qty: 60 CAPSULE | Refills: 0 | Status: SHIPPED
Start: 2022-02-14 | End: 2022-02-14

## 2022-02-14 RX ORDER — ALBUTEROL SULFATE 90 UG/1
2 AEROSOL, METERED RESPIRATORY (INHALATION) EVERY 6 HOURS PRN
Qty: 8.5 G | Refills: 1 | Status: SHIPPED | OUTPATIENT
Start: 2022-02-14 | End: 2022-08-06

## 2022-02-14 RX ORDER — AMOXICILLIN AND CLAVULANATE POTASSIUM 875; 125 MG/1; MG/1
1 TABLET, FILM COATED ORAL 2 TIMES DAILY
Qty: 10 TABLET | Refills: 0 | Status: SHIPPED | OUTPATIENT
Start: 2022-02-14 | End: 2022-02-19

## 2022-02-14 ASSESSMENT — ENCOUNTER SYMPTOMS
SORE THROAT: 1
HEADACHES: 1
SINUS PAIN: 1
VOMITING: 0
NAUSEA: 1
SWOLLEN GLANDS: 0
RHINORRHEA: 1
COUGH: 1
DIARRHEA: 0
WHEEZING: 0

## 2022-02-14 NOTE — PROGRESS NOTES
"Subjective     Ethan Gerber is a 31 y.o. male who presents with Cough (chills, onset 2/2 several at home negative test's )            URI   This is a new problem. Episode onset: 2 weeks. The problem has been waxing and waning. Associated symptoms include congestion, coughing, headaches, nausea, a rash, rhinorrhea, sinus pain and a sore throat. Pertinent negatives include no chest pain, diarrhea, ear pain, plugged ear sensation, swollen glands, vomiting or wheezing.   COVID-19 vaccinated with booster; no prior known Covid infection.  Home test negative.  Home exposure to respiratory tract illness.  PMH childhood reactive airway disease.  Notes reworsening with last 3 days with chills.  Review of Systems   HENT: Positive for congestion, rhinorrhea, sinus pain and sore throat. Negative for ear pain.    Respiratory: Positive for cough. Negative for wheezing.    Cardiovascular: Negative for chest pain.   Gastrointestinal: Positive for nausea. Negative for diarrhea and vomiting.   Skin: Positive for rash.   Neurological: Positive for headaches.              Objective     /82   Pulse (!) 104   Temp 37.6 °C (99.6 °F)   Resp 18   Ht 1.727 m (5' 8\")   Wt 81.6 kg (180 lb)   SpO2 96%   BMI 27.37 kg/m²      Physical Exam  Constitutional:       General: He is not in acute distress.     Appearance: He is well-developed. He is not ill-appearing or toxic-appearing.   HENT:      Head: Normocephalic.      Jaw: No trismus.      Right Ear: Tympanic membrane and ear canal normal.      Left Ear: Tympanic membrane and ear canal normal.      Nose: Mucosal edema and rhinorrhea present.      Right Sinus: Maxillary sinus tenderness present.      Left Sinus: Maxillary sinus tenderness present.      Mouth/Throat:      Pharynx: Uvula midline. No oropharyngeal exudate or posterior oropharyngeal erythema.   Eyes:      Conjunctiva/sclera: Conjunctivae normal.   Neck:      Trachea: Trachea normal.   Cardiovascular:      Rate and " Rhythm: Normal rate and regular rhythm.      Heart sounds: Normal heart sounds.   Pulmonary:      Effort: Pulmonary effort is normal.      Breath sounds: Normal breath sounds.   Musculoskeletal:      Cervical back: Neck supple.   Lymphadenopathy:      Cervical: No cervical adenopathy.   Skin:     General: Skin is warm and dry.   Neurological:      Mental Status: He is alert.   Psychiatric:         Behavior: Behavior is cooperative.                             Assessment & Plan        1. Acute bronchitis with bronchospasm  Rx albuterol  Recommended supportive care measures, including rest, increasing oral fluid intake and use of over-the-counter medications for relief of symptoms.  2. Acute rhinosinusitis  Due to duration and worsening of symptoms:  Rx Augmentin.  3. Cough  - DX-CHEST-2 VIEWS; Interpretation per radiologist:   1.  Unremarkable two view chest.  - SARS-CoV-2 PCR (24 hour In-House): Collect NP swab in [facility]; Future

## 2022-02-15 LAB
COVID ORDER STATUS COVID19: NORMAL
SARS-COV-2 RNA RESP QL NAA+PROBE: DETECTED
SPECIMEN SOURCE: ABNORMAL

## 2022-02-16 ENCOUNTER — TELEPHONE (OUTPATIENT)
Dept: MEDICAL GROUP | Facility: PHYSICIAN GROUP | Age: 32
End: 2022-02-16
Payer: COMMERCIAL

## 2022-02-16 NOTE — TELEPHONE ENCOUNTER
I spoke to the patient to inform him of his positive COVID test.  He said that he was aware of same and started home quarantine.  I  Offered to send him information on home quarantine, however, he declined same.  I did review the criteria for ending quarantine.  He had no questions.

## 2023-08-06 ENCOUNTER — OFFICE VISIT (OUTPATIENT)
Dept: URGENT CARE | Facility: PHYSICIAN GROUP | Age: 33
End: 2023-08-06
Payer: COMMERCIAL

## 2023-08-06 VITALS
HEART RATE: 90 BPM | RESPIRATION RATE: 18 BRPM | OXYGEN SATURATION: 97 % | TEMPERATURE: 100.3 F | BODY MASS INDEX: 28.04 KG/M2 | DIASTOLIC BLOOD PRESSURE: 52 MMHG | SYSTOLIC BLOOD PRESSURE: 110 MMHG | WEIGHT: 185 LBS | HEIGHT: 68 IN

## 2023-08-06 DIAGNOSIS — J02.9 PHARYNGITIS, UNSPECIFIED ETIOLOGY: ICD-10-CM

## 2023-08-06 DIAGNOSIS — R09.81 NASAL CONGESTION: ICD-10-CM

## 2023-08-06 DIAGNOSIS — R06.2 WHEEZING: ICD-10-CM

## 2023-08-06 DIAGNOSIS — U07.1 COVID-19: ICD-10-CM

## 2023-08-06 DIAGNOSIS — R05.1 ACUTE COUGH: ICD-10-CM

## 2023-08-06 DIAGNOSIS — R52 BODY ACHES: ICD-10-CM

## 2023-08-06 DIAGNOSIS — R50.9 FEVER, UNSPECIFIED FEVER CAUSE: ICD-10-CM

## 2023-08-06 LAB
FLUAV RNA SPEC QL NAA+PROBE: NEGATIVE
FLUBV RNA SPEC QL NAA+PROBE: NEGATIVE
RSV RNA SPEC QL NAA+PROBE: NEGATIVE
S PYO DNA SPEC NAA+PROBE: NOT DETECTED
SARS-COV-2 RNA RESP QL NAA+PROBE: NEGATIVE

## 2023-08-06 PROCEDURE — 0241U POCT CEPHEID COV-2, FLU A/B, RSV - PCR: CPT

## 2023-08-06 PROCEDURE — 99213 OFFICE O/P EST LOW 20 MIN: CPT

## 2023-08-06 PROCEDURE — 87651 STREP A DNA AMP PROBE: CPT

## 2023-08-06 PROCEDURE — 3074F SYST BP LT 130 MM HG: CPT

## 2023-08-06 PROCEDURE — 3078F DIAST BP <80 MM HG: CPT

## 2023-08-06 RX ORDER — LIDOCAINE HYDROCHLORIDE 20 MG/ML
5 SOLUTION OROPHARYNGEAL PRN
Qty: 100 ML | Refills: 0 | Status: SHIPPED | OUTPATIENT
Start: 2023-08-06 | End: 2023-11-03

## 2023-08-06 RX ORDER — ALBUTEROL SULFATE 90 UG/1
2 AEROSOL, METERED RESPIRATORY (INHALATION) EVERY 6 HOURS PRN
Qty: 8.5 G | Refills: 0 | Status: SHIPPED
Start: 2023-08-06 | End: 2023-08-06

## 2023-08-06 RX ORDER — DEXAMETHASONE SODIUM PHOSPHATE 10 MG/ML
10 INJECTION INTRAMUSCULAR; INTRAVENOUS ONCE
Status: COMPLETED | OUTPATIENT
Start: 2023-08-06 | End: 2023-08-06

## 2023-08-06 RX ORDER — AZELASTINE HYDROCHLORIDE, FLUTICASONE PROPIONATE 137; 50 UG/1; UG/1
SPRAY, METERED NASAL
Qty: 23 G | Refills: 0 | Status: SHIPPED | OUTPATIENT
Start: 2023-08-06 | End: 2024-03-17

## 2023-08-06 RX ORDER — ALBUTEROL SULFATE 90 UG/1
2 AEROSOL, METERED RESPIRATORY (INHALATION) EVERY 6 HOURS PRN
Qty: 8.5 G | Refills: 0 | Status: SHIPPED | OUTPATIENT
Start: 2023-08-06 | End: 2024-03-17

## 2023-08-06 RX ORDER — DEXTROMETHORPHAN HYDROBROMIDE AND PROMETHAZINE HYDROCHLORIDE 15; 6.25 MG/5ML; MG/5ML
5 SYRUP ORAL EVERY 6 HOURS PRN
Qty: 100 ML | Refills: 0 | Status: SHIPPED | OUTPATIENT
Start: 2023-08-06 | End: 2023-11-03

## 2023-08-06 RX ORDER — DEXTROMETHORPHAN HYDROBROMIDE AND PROMETHAZINE HYDROCHLORIDE 15; 6.25 MG/5ML; MG/5ML
5 SYRUP ORAL EVERY 6 HOURS PRN
Qty: 100 ML | Refills: 0 | Status: SHIPPED
Start: 2023-08-06 | End: 2023-08-06

## 2023-08-06 RX ORDER — LIDOCAINE HYDROCHLORIDE 20 MG/ML
5 SOLUTION OROPHARYNGEAL PRN
Qty: 100 ML | Refills: 0 | Status: SHIPPED
Start: 2023-08-06 | End: 2023-08-06

## 2023-08-06 RX ORDER — AZELASTINE HYDROCHLORIDE, FLUTICASONE PROPIONATE 137; 50 UG/1; UG/1
SPRAY, METERED NASAL
Qty: 23 G | Refills: 0 | Status: SHIPPED
Start: 2023-08-06 | End: 2023-08-06

## 2023-08-06 RX ADMIN — DEXAMETHASONE SODIUM PHOSPHATE 10 MG: 10 INJECTION INTRAMUSCULAR; INTRAVENOUS at 10:26

## 2023-08-06 NOTE — PROGRESS NOTES
"Subjective     Ethan Chip Gerber is a 33 y.o. male who presents with Other (X4 day Been having covid signs, bodyaches, chills, swollen tonsils, fever, took covis test and were neg)            HPI patient works up at [employer].  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  He states on Monday they just on a 4-hour presentation together.  Wednesday he started to feel congestion, by the next day started to have chills, sweats, body aches and fever.  He states he did take a home COVID test on Wednesday and Thursday which were both negative.  He has been taking Tylenol around-the-clock, and is concerned he is taking too much.  He states he is trying to drink a lot of fluids and keep hydrated.  He is starting to now have a cough, he does state that his chest feels tighter when he is coughing.        ROS Same as above     No Known Allergies    Current Outpatient Medications   Medication Sig    albuterol 108 (90 Base) MCG/ACT Aero Soln inhalation aerosol Inhale 2 Puffs every 6 hours as needed for Shortness of Breath.    Azelastine-Fluticasone 137-50 MCG/ACT Suspension One spray per nostril twice daily as needed for congestion.    lidocaine (XYLOCAINE) 2 % Solution Take 5 mL by mouth as needed for Throat/Mouth Pain.    promethazine-dextromethorphan (PROMETHAZINE-DM) 6.25-15 MG/5ML syrup Take 5 mL by mouth every 6 hours as needed for Cough for up to 20 doses.    mesalamine (LIALDA) 1.2 GM Tablet Delayed Response Take 1.2 mg by mouth 2 times a day. Taking 2 caps daily        Past Medical History:   Diagnosis Date    Kidney stone     Nephrolithiasis             Objective     /52   Pulse 90   Temp 37.9 °C (100.3 °F) (Temporal)   Resp 18   Ht 1.727 m (5' 8\")   Wt 83.9 kg (185 lb)   SpO2 97%   BMI 28.13 kg/m²      Physical Exam  Vitals reviewed.   Constitutional:       Appearance: Normal appearance. He is not toxic-appearing.   HENT:      Head: Normocephalic and atraumatic.     "  Right Ear: Tympanic membrane, ear canal and external ear normal.      Left Ear: Tympanic membrane, ear canal and external ear normal.      Nose: Congestion present.      Mouth/Throat:      Mouth: Mucous membranes are moist.      Pharynx: Uvula midline. Oropharyngeal exudate and posterior oropharyngeal erythema present. No pharyngeal swelling or uvula swelling.      Tonsils: Tonsillar exudate present. No tonsillar abscesses. 2+ on the right. 2+ on the left.   Eyes:      Conjunctiva/sclera: Conjunctivae normal.   Cardiovascular:      Rate and Rhythm: Normal rate and regular rhythm.      Heart sounds: Normal heart sounds.   Pulmonary:      Effort: Pulmonary effort is normal. No respiratory distress.      Breath sounds: No stridor. Examination of the right-upper field reveals wheezing. Examination of the left-upper field reveals wheezing. Wheezing present. No rhonchi or rales.   Musculoskeletal:         General: Normal range of motion.      Cervical back: Normal range of motion.   Lymphadenopathy:      Head:      Right side of head: Submandibular adenopathy present. No submental, tonsillar, preauricular or posterior auricular adenopathy.      Left side of head: Submandibular adenopathy present. No submental, tonsillar, preauricular or posterior auricular adenopathy.      Cervical: Cervical adenopathy present.      Right cervical: Superficial cervical adenopathy present. No deep or posterior cervical adenopathy.     Left cervical: Superficial cervical adenopathy present. No deep or posterior cervical adenopathy.   Skin:     General: Skin is warm and dry.      Capillary Refill: Capillary refill takes less than 2 seconds.   Neurological:      Mental Status: He is alert and oriented to person, place, and time.                 Assessment & Plan      Patient presents today due to being sick since Wednesday.  He states his coworker currently works closely with his tested positive, now he has been having fevers, body aches,  nasal congestion, sore throat, cough.  States he is not feeling any better and has been 4 days now.    On exam he has nasal congestion, there is erythema in the posterior pharynx with exudates.  Uvula remains midline, no edema.  His tonsils are +2 bilaterally with exudates, no sign of abscess.  He has bilateral submandibular and superficial cervical lymphadenopathy.  There is bilateral wheezing in the upper lungs bilaterally, there is no stridor, rales, rhonchi, SPO2 in clinic 97% today.      Discussed point-of-care testing today in clinic for COVID/flu/RSV and strep.  Educated if he is positive for strep he needs to remain home until he has been on antibiotics for 24 hours, throw away his toothbrush after 24 hours to get a new 1.  Discussed OTC Tylenol/ibuprofen for symptom management, warm salt water gargles, warm and cold soothing liquids, mild diet as tolerated, humidifier, hot showers.  Discussed you will be notified via MyChart of results of all point-of-care testing in clinic today.  Discussed antibiotics will be called in if he test positive for strep.  Went over CDC guidelines for isolation with COVID.  Patient agreement plan of  Differential diagnosis, natural history, supportive care, and indications for immediate follow-up were discussed.      Patient updated via MyChart, strep test negative, positive for COVID.    1. COVID-19        2. Pharyngitis, unspecified etiology  POCT CEPHEID COV-2, FLU A/B, RSV - PCR    POCT CEPHEID GROUP A STREP - PCR    dexamethasone (Decadron) injection (check route below) 10 mg    lidocaine (XYLOCAINE) 2 % Solution    DISCONTINUED: lidocaine (XYLOCAINE) 2 % Solution      3. Fever, unspecified fever cause  POCT CEPHEID COV-2, FLU A/B, RSV - PCR      4. Nasal congestion  POCT CEPHEID COV-2, FLU A/B, RSV - PCR    Azelastine-Fluticasone 137-50 MCG/ACT Suspension    DISCONTINUED: Azelastine-Fluticasone 137-50 MCG/ACT Suspension      5. Wheezing  POCT CEPHEID COV-2, FLU A/B, RSV -  PCR    albuterol 108 (90 Base) MCG/ACT Aero Soln inhalation aerosol    DISCONTINUED: albuterol 108 (90 Base) MCG/ACT Aero Soln inhalation aerosol      6. Body aches  POCT CEPHEID COV-2, FLU A/B, RSV - PCR      7. Acute cough  promethazine-dextromethorphan (PROMETHAZINE-DM) 6.25-15 MG/5ML syrup    DISCONTINUED: promethazine-dextromethorphan (PROMETHAZINE-DM) 6.25-15 MG/5ML syrup

## 2023-08-06 NOTE — LETTER
August 6, 2023         Patient: Ethan Gerber   YOB: 1990   Date of Visit: 8/6/2023           To Whom it May Concern:    Ethan Gerber was seen in my clinic on 8/6/2023. Please excuse any absences this week due to an acute illness.     If you have any questions or concerns, please don't hesitate to call.        Sincerely,           AUDREY Lauren.  Electronically Signed

## 2023-08-09 ENCOUNTER — TELEPHONE (OUTPATIENT)
Dept: HEALTH INFORMATION MANAGEMENT | Facility: OTHER | Age: 33
End: 2023-08-09

## 2023-11-03 ENCOUNTER — OFFICE VISIT (OUTPATIENT)
Dept: MEDICAL GROUP | Facility: PHYSICIAN GROUP | Age: 33
End: 2023-11-03
Payer: COMMERCIAL

## 2023-11-03 VITALS
OXYGEN SATURATION: 97 % | TEMPERATURE: 97.3 F | HEART RATE: 63 BPM | WEIGHT: 183 LBS | DIASTOLIC BLOOD PRESSURE: 68 MMHG | BODY MASS INDEX: 27.74 KG/M2 | SYSTOLIC BLOOD PRESSURE: 102 MMHG | HEIGHT: 68 IN

## 2023-11-03 DIAGNOSIS — R20.2 NUMBNESS AND TINGLING: ICD-10-CM

## 2023-11-03 DIAGNOSIS — R20.0 NUMBNESS AND TINGLING: ICD-10-CM

## 2023-11-03 DIAGNOSIS — Z23 NEED FOR VACCINATION: ICD-10-CM

## 2023-11-03 DIAGNOSIS — K51.00 ULCERATIVE PANCOLITIS WITHOUT COMPLICATION (HCC): ICD-10-CM

## 2023-11-03 PROBLEM — K29.70 GASTRITIS: Status: RESOLVED | Noted: 2019-12-23 | Resolved: 2023-11-03

## 2023-11-03 PROBLEM — R14.0 BLOATED ABDOMEN: Status: RESOLVED | Noted: 2019-11-11 | Resolved: 2023-11-03

## 2023-11-03 PROCEDURE — 90471 IMMUNIZATION ADMIN: CPT | Performed by: STUDENT IN AN ORGANIZED HEALTH CARE EDUCATION/TRAINING PROGRAM

## 2023-11-03 PROCEDURE — 99213 OFFICE O/P EST LOW 20 MIN: CPT | Mod: 25 | Performed by: STUDENT IN AN ORGANIZED HEALTH CARE EDUCATION/TRAINING PROGRAM

## 2023-11-03 PROCEDURE — 3078F DIAST BP <80 MM HG: CPT | Performed by: STUDENT IN AN ORGANIZED HEALTH CARE EDUCATION/TRAINING PROGRAM

## 2023-11-03 PROCEDURE — 90686 IIV4 VACC NO PRSV 0.5 ML IM: CPT | Performed by: STUDENT IN AN ORGANIZED HEALTH CARE EDUCATION/TRAINING PROGRAM

## 2023-11-03 PROCEDURE — 3074F SYST BP LT 130 MM HG: CPT | Performed by: STUDENT IN AN ORGANIZED HEALTH CARE EDUCATION/TRAINING PROGRAM

## 2023-11-03 RX ORDER — CETIRIZINE HYDROCHLORIDE 10 MG/1
TABLET, CHEWABLE ORAL
COMMUNITY
End: 2024-03-17

## 2023-11-03 SDOH — HEALTH STABILITY: PHYSICAL HEALTH: ON AVERAGE, HOW MANY MINUTES DO YOU ENGAGE IN EXERCISE AT THIS LEVEL?: 20 MIN

## 2023-11-03 SDOH — ECONOMIC STABILITY: HOUSING INSECURITY

## 2023-11-03 SDOH — ECONOMIC STABILITY: TRANSPORTATION INSECURITY
IN THE PAST 12 MONTHS, HAS THE LACK OF TRANSPORTATION KEPT YOU FROM MEDICAL APPOINTMENTS OR FROM GETTING MEDICATIONS?: NO

## 2023-11-03 SDOH — ECONOMIC STABILITY: FOOD INSECURITY: WITHIN THE PAST 12 MONTHS, YOU WORRIED THAT YOUR FOOD WOULD RUN OUT BEFORE YOU GOT MONEY TO BUY MORE.: NEVER TRUE

## 2023-11-03 SDOH — ECONOMIC STABILITY: HOUSING INSECURITY
IN THE LAST 12 MONTHS, WAS THERE A TIME WHEN YOU DID NOT HAVE A STEADY PLACE TO SLEEP OR SLEPT IN A SHELTER (INCLUDING NOW)?: NO

## 2023-11-03 SDOH — HEALTH STABILITY: PHYSICAL HEALTH: ON AVERAGE, HOW MANY DAYS PER WEEK DO YOU ENGAGE IN MODERATE TO STRENUOUS EXERCISE (LIKE A BRISK WALK)?: 5 DAYS

## 2023-11-03 SDOH — ECONOMIC STABILITY: INCOME INSECURITY: IN THE LAST 12 MONTHS, WAS THERE A TIME WHEN YOU WERE NOT ABLE TO PAY THE MORTGAGE OR RENT ON TIME?: NO

## 2023-11-03 SDOH — ECONOMIC STABILITY: FOOD INSECURITY: WITHIN THE PAST 12 MONTHS, THE FOOD YOU BOUGHT JUST DIDN'T LAST AND YOU DIDN'T HAVE MONEY TO GET MORE.: NEVER TRUE

## 2023-11-03 SDOH — ECONOMIC STABILITY: TRANSPORTATION INSECURITY
IN THE PAST 12 MONTHS, HAS LACK OF RELIABLE TRANSPORTATION KEPT YOU FROM MEDICAL APPOINTMENTS, MEETINGS, WORK OR FROM GETTING THINGS NEEDED FOR DAILY LIVING?: NO

## 2023-11-03 SDOH — HEALTH STABILITY: MENTAL HEALTH
STRESS IS WHEN SOMEONE FEELS TENSE, NERVOUS, ANXIOUS, OR CAN'T SLEEP AT NIGHT BECAUSE THEIR MIND IS TROUBLED. HOW STRESSED ARE YOU?: ONLY A LITTLE

## 2023-11-03 SDOH — ECONOMIC STABILITY: TRANSPORTATION INSECURITY
IN THE PAST 12 MONTHS, HAS LACK OF TRANSPORTATION KEPT YOU FROM MEETINGS, WORK, OR FROM GETTING THINGS NEEDED FOR DAILY LIVING?: NO

## 2023-11-03 SDOH — ECONOMIC STABILITY: INCOME INSECURITY: HOW HARD IS IT FOR YOU TO PAY FOR THE VERY BASICS LIKE FOOD, HOUSING, MEDICAL CARE, AND HEATING?: NOT HARD AT ALL

## 2023-11-03 ASSESSMENT — SOCIAL DETERMINANTS OF HEALTH (SDOH)
DO YOU BELONG TO ANY CLUBS OR ORGANIZATIONS SUCH AS CHURCH GROUPS UNIONS, FRATERNAL OR ATHLETIC GROUPS, OR SCHOOL GROUPS?: YES
DO YOU BELONG TO ANY CLUBS OR ORGANIZATIONS SUCH AS CHURCH GROUPS UNIONS, FRATERNAL OR ATHLETIC GROUPS, OR SCHOOL GROUPS?: YES
HOW OFTEN DO YOU GET TOGETHER WITH FRIENDS OR RELATIVES?: MORE THAN THREE TIMES A WEEK
HOW OFTEN DO YOU HAVE SIX OR MORE DRINKS ON ONE OCCASION: NEVER
HOW HARD IS IT FOR YOU TO PAY FOR THE VERY BASICS LIKE FOOD, HOUSING, MEDICAL CARE, AND HEATING?: NOT HARD AT ALL
HOW OFTEN DO YOU ATTENT MEETINGS OF THE CLUB OR ORGANIZATION YOU BELONG TO?: MORE THAN 4 TIMES PER YEAR
HOW OFTEN DO YOU GET TOGETHER WITH FRIENDS OR RELATIVES?: MORE THAN THREE TIMES A WEEK
WITHIN THE PAST 12 MONTHS, YOU WORRIED THAT YOUR FOOD WOULD RUN OUT BEFORE YOU GOT THE MONEY TO BUY MORE: NEVER TRUE
HOW OFTEN DO YOU ATTENT MEETINGS OF THE CLUB OR ORGANIZATION YOU BELONG TO?: MORE THAN 4 TIMES PER YEAR
IN A TYPICAL WEEK, HOW MANY TIMES DO YOU TALK ON THE PHONE WITH FAMILY, FRIENDS, OR NEIGHBORS?: MORE THAN THREE TIMES A WEEK
HOW OFTEN DO YOU HAVE A DRINK CONTAINING ALCOHOL: NEVER
HOW OFTEN DO YOU ATTEND CHURCH OR RELIGIOUS SERVICES?: MORE THAN 4 TIMES PER YEAR
IN A TYPICAL WEEK, HOW MANY TIMES DO YOU TALK ON THE PHONE WITH FAMILY, FRIENDS, OR NEIGHBORS?: MORE THAN THREE TIMES A WEEK
HOW MANY DRINKS CONTAINING ALCOHOL DO YOU HAVE ON A TYPICAL DAY WHEN YOU ARE DRINKING: PATIENT DOES NOT DRINK
HOW OFTEN DO YOU ATTEND CHURCH OR RELIGIOUS SERVICES?: MORE THAN 4 TIMES PER YEAR

## 2023-11-03 ASSESSMENT — ENCOUNTER SYMPTOMS
CHILLS: 0
SHORTNESS OF BREATH: 0
FEVER: 0
PALPITATIONS: 0

## 2023-11-03 ASSESSMENT — LIFESTYLE VARIABLES
HOW OFTEN DO YOU HAVE A DRINK CONTAINING ALCOHOL: NEVER
HOW MANY STANDARD DRINKS CONTAINING ALCOHOL DO YOU HAVE ON A TYPICAL DAY: PATIENT DOES NOT DRINK
HOW OFTEN DO YOU HAVE SIX OR MORE DRINKS ON ONE OCCASION: NEVER
SKIP TO QUESTIONS 9-10: 1
AUDIT-C TOTAL SCORE: 0

## 2023-11-03 ASSESSMENT — PATIENT HEALTH QUESTIONNAIRE - PHQ9: CLINICAL INTERPRETATION OF PHQ2 SCORE: 0

## 2023-11-03 NOTE — PROGRESS NOTES
"Subjective:     CC: Establish care    HPI:   Mr. Gerber is a pleasant 33 year old who presents today to establish care.    Patient currently does not have any acute questions or concerns.    He was recently seen by [facility] for his ulcerative [facility].      Problem   Numbness and Tingling    Resolved  Established with neurology, had complete work-up which was normal.     Ulcerative Colitis (Hcc)    Diagnosed 2019  Medication: Mesalamine 1.2 mg BID, tolerating well  Established with Dr. Villarreal at [facility]        Erosive Esophagitis    Diagnosed about 4 years ago, stable     Blood in Stool    Colonoscopy completed in September 2023  Likely 2/2 inflamed internal hemorrhoids     Renal stones    Chronic, stable  First kidney stone at 16, last episode was about five years ago       Gastritis (Resolved)   Bloated Abdomen (Resolved)       Health Maintenance: Completed  Received influenza vaccine today.    ROS:  Review of Systems   Constitutional:  Negative for chills and fever.   Respiratory:  Negative for shortness of breath.    Cardiovascular:  Negative for chest pain and palpitations.       Objective:     Exam:  /68   Pulse 63   Temp 36.3 °C (97.3 °F) (Temporal)   Ht 1.727 m (5' 8\")   Wt 83 kg (183 lb)   SpO2 97%   BMI 27.83 kg/m²  Body mass index is 27.83 kg/m².    Physical Exam  Constitutional:       Appearance: Normal appearance.   Cardiovascular:      Rate and Rhythm: Normal rate and regular rhythm.   Pulmonary:      Effort: Pulmonary effort is normal. No respiratory distress.      Breath sounds: Normal breath sounds. No stridor. No wheezing or rhonchi.   Neurological:      Mental Status: He is alert.             Labs:     Assessment & Plan:     33 y.o. male with the following -     1. Ulcerative pancolitis without complication (HCC)  Chronic, stable.  Patient is established with [facility] and recently saw Dr. Alan Worthy. Per patient had discussion with Dr. Worthy regarding " continuation of mesalamine.Patient has had good results with mesalamine and will continue to take it. We will request recent records from [facility].    2. Numbness and tingling  Resolved. Patient had full neuro work-up by Dr. Hanks with normal findings.    3. Need for vaccination  Received influenza vaccine today.  - INFLUENZA VACCINE QUAD INJ (PF)          Return in about 3 months (around 2/3/2024) for Annual.    Please note that this dictation was created using voice recognition software. I have made every reasonable attempt to correct obvious errors, but I expect that there are errors of grammar and possibly content that I did not discover before finalizing the note.

## 2023-11-03 NOTE — LETTER
[facility]  Maria Victoria Baker M.D.  [address]  [place] [address]  Fax: [phone]   Authorization for Release/Disclosure of   Protected Health Information   Name: COOKIE GERBER : 1990 SSN: [id number]   Address: [address] [place] [id number] Phone:    There are no phone numbers on file.   I authorize the entity listed below to release/disclose the PHI below to:   [facility]/Maria Victoria Baker M.D. and Maria Victoria Baker M.D.   Provider or Entity Name:  [facility]   Address   [address] [place] [address]   Phone:      Fax:     Reason for request: continuity of care   Information to be released:    [  ] LAST COLONOSCOPY,  including any PATH REPORT and follow-up  [  ] LAST FIT/COLOGUARD RESULT [  ] LAST DEXA  [  ] LAST MAMMOGRAM  [  ] LAST PAP  [ x] LAST LABS [  ] RETINA EXAM REPORT  [  ] IMMUNIZATION RECORDS  [x] Release all info      [  ] Check here and initial the line next to each item to release ALL health information INCLUDING  _____ Care and treatment for drug and / or alcohol abuse  _____ HIV testing, infection status, or AIDS  _____ Genetic Testing    DATES OF SERVICE OR TIME PERIOD TO BE DISCLOSED: _____________  I understand and acknowledge that:  * This Authorization may be revoked at any time by you in writing, except if your health information has already been used or disclosed.  * Your health information that will be used or disclosed as a result of you signing this authorization could be re-disclosed by the recipient. If this occurs, your re-disclosed health information may no longer be protected by State or Federal laws.  * You may refuse to sign this Authorization. Your refusal will not affect your ability to obtain treatment.  * This Authorization becomes effective upon signing and will  on (date) __________.      If no date is indicated, this Authorization will  one (1) year from the signature date.    Name: Cookie Gerber  Signature: Date:    11/3/2023     PLEASE FAX REQUESTED RECORDS BACK TO: [phone]

## 2024-03-17 ENCOUNTER — OFFICE VISIT (OUTPATIENT)
Dept: URGENT CARE | Facility: PHYSICIAN GROUP | Age: 34
End: 2024-03-17
Payer: COMMERCIAL

## 2024-03-17 VITALS
OXYGEN SATURATION: 95 % | HEART RATE: 67 BPM | BODY MASS INDEX: 26.52 KG/M2 | TEMPERATURE: 97.9 F | HEIGHT: 68 IN | RESPIRATION RATE: 16 BRPM | SYSTOLIC BLOOD PRESSURE: 118 MMHG | DIASTOLIC BLOOD PRESSURE: 74 MMHG | WEIGHT: 175 LBS

## 2024-03-17 DIAGNOSIS — J02.0 STREP PHARYNGITIS: ICD-10-CM

## 2024-03-17 LAB
FLUAV RNA SPEC QL NAA+PROBE: NEGATIVE
FLUBV RNA SPEC QL NAA+PROBE: NEGATIVE
RSV RNA SPEC QL NAA+PROBE: NEGATIVE
S PYO DNA SPEC NAA+PROBE: DETECTED
SARS-COV-2 RNA RESP QL NAA+PROBE: NEGATIVE

## 2024-03-17 PROCEDURE — 3078F DIAST BP <80 MM HG: CPT | Performed by: FAMILY MEDICINE

## 2024-03-17 PROCEDURE — 0241U POCT CEPHEID COV-2, FLU A/B, RSV - PCR: CPT | Performed by: FAMILY MEDICINE

## 2024-03-17 PROCEDURE — 99213 OFFICE O/P EST LOW 20 MIN: CPT | Performed by: FAMILY MEDICINE

## 2024-03-17 PROCEDURE — 3074F SYST BP LT 130 MM HG: CPT | Performed by: FAMILY MEDICINE

## 2024-03-17 PROCEDURE — 87651 STREP A DNA AMP PROBE: CPT | Performed by: FAMILY MEDICINE

## 2024-03-17 RX ORDER — AMOXICILLIN 500 MG/1
500 CAPSULE ORAL 2 TIMES DAILY
Qty: 20 CAPSULE | Refills: 0 | Status: SHIPPED | OUTPATIENT
Start: 2024-03-17 | End: 2024-03-27

## 2024-03-17 ASSESSMENT — ENCOUNTER SYMPTOMS: SORE THROAT: 1

## 2024-03-17 NOTE — PROGRESS NOTES
"Subjective:     Ethan Gerber is a 33 y.o. male who presents for Sore Throat (X1 week: URI issues, exposed to strep. Last night throat started hurting, inflamed tonsils and redness. )    HPI  Pt presents for evaluation of an acute problem  Patient here for evaluation of sore throat for about a week  Having redness in the back of the throat and tonsils feel swollen   Has productive cough   No headaches   No vomiting or diarrhea   Sore throat is equal bilaterally and worse with swallowing   Does have exposure to someone with strep pharyngitis    Review of Systems   Constitutional:  Positive for malaise/fatigue.   HENT:  Positive for sore throat.        PMH:  has a past medical history of Bloated abdomen (11/11/2019), Gastritis (12/23/2019), Kidney stone, and Nephrolithiasis.  MEDS:   Current Outpatient Medications:     amoxicillin (AMOXIL) 500 MG Cap, Take 1 Capsule by mouth 2 times a day for 10 days., Disp: 20 Capsule, Rfl: 0    mesalamine (LIALDA) 1.2 GM Tablet Delayed Response, Take 1.2 mg by mouth 2 times a day. Taking 2 caps daily, Disp: , Rfl:   ALLERGIES: No Known Allergies  SURGHX:   Past Surgical History:   Procedure Laterality Date    LITHOTRIPSY Bilateral      SOCHX:  reports that he has never smoked. He has never used smokeless tobacco. He reports that he does not drink alcohol and does not use drugs.     Objective:   /74   Pulse 67   Temp 36.6 °C (97.9 °F)   Resp 16   Ht 1.727 m (5' 8\")   Wt 79.4 kg (175 lb)   SpO2 95%   BMI 26.61 kg/m²     Physical Exam  Constitutional:       General: He is not in acute distress.     Appearance: He is well-developed. He is not diaphoretic.   HENT:      Head: Normocephalic and atraumatic.      Right Ear: Tympanic membrane, ear canal and external ear normal.      Left Ear: Tympanic membrane, ear canal and external ear normal.      Nose: Nose normal.      Mouth/Throat:      Mouth: Mucous membranes are moist.      Comments: Moderate erythema in posterior " pharynx, no unilateral swelling, no uvular deviation  Neck:      Trachea: No tracheal deviation.   Cardiovascular:      Rate and Rhythm: Normal rate and regular rhythm.      Heart sounds: Normal heart sounds.   Pulmonary:      Effort: Pulmonary effort is normal. No respiratory distress.      Breath sounds: Normal breath sounds. No wheezing or rales.   Musculoskeletal:         General: Normal range of motion.      Cervical back: Normal range of motion and neck supple. Tenderness present.   Lymphadenopathy:      Cervical: Cervical adenopathy present.   Skin:     General: Skin is warm and dry.      Findings: No rash.   Neurological:      Mental Status: He is alert.   Psychiatric:         Behavior: Behavior normal.         Thought Content: Thought content normal.         Judgment: Judgment normal.         Assessment/Plan:   Assessment    1. Strep pharyngitis  - POCT CEPHEID COV-2, FLU A/B, RSV - PCR  - POCT CEPHEID GROUP A STREP - PCR  - amoxicillin (AMOXIL) 500 MG Cap; Take 1 Capsule by mouth 2 times a day for 10 days.  Dispense: 20 Capsule; Refill: 0    Patient with strep pharyngitis.  Treat with amoxicillin.  Reviewed other supportive care measures and all questions answered.  Follow-up in the urgent care as needed.

## 2024-03-20 ENCOUNTER — APPOINTMENT (OUTPATIENT)
Dept: MEDICAL GROUP | Facility: PHYSICIAN GROUP | Age: 34
End: 2024-03-20
Payer: COMMERCIAL

## 2024-03-21 ENCOUNTER — APPOINTMENT (OUTPATIENT)
Dept: MEDICAL GROUP | Facility: PHYSICIAN GROUP | Age: 34
End: 2024-03-21
Payer: COMMERCIAL

## 2024-04-29 NOTE — ASSESSMENT & PLAN NOTE
Hold statin for now while NPO and with elevated LFTs     This is a new problem.  Onset: The patient mentions that he has experienced bloody stools in the past - and though this has resolved, he has been experiencing bloated abdomen and belching.   Modifying factors:  He followed up with GI - and is s/p EGD and colonoscopy.  He was found to have gastritis and esophagitis along with ulcerative colitis.  Treatments tried: He is in the process of starting mesalamine.   He continues to have persistent belching. Has not started PPI.   Associated sx: none

## 2024-05-06 ENCOUNTER — APPOINTMENT (OUTPATIENT)
Dept: MEDICAL GROUP | Facility: PHYSICIAN GROUP | Age: 34
End: 2024-05-06
Payer: COMMERCIAL

## 2024-05-07 NOTE — TELEPHONE ENCOUNTER
Was called by lab with positive blood culture results  Spoke with patient by phone on 1/19/2018  He is afebrile, feeling fine, no dysuria or flank pain  1/2 positive blood cultures are likely contaminant, encouraged him to seek medical attention if signs of fever or other signs of infection   [id number]

## 2024-06-22 ENCOUNTER — OFFICE VISIT (OUTPATIENT)
Dept: URGENT CARE | Facility: PHYSICIAN GROUP | Age: 34
End: 2024-06-22
Payer: COMMERCIAL

## 2024-06-22 ENCOUNTER — APPOINTMENT (OUTPATIENT)
Dept: URGENT CARE | Facility: PHYSICIAN GROUP | Age: 34
End: 2024-06-22
Payer: COMMERCIAL

## 2024-06-22 VITALS
WEIGHT: 184.6 LBS | HEART RATE: 70 BPM | DIASTOLIC BLOOD PRESSURE: 64 MMHG | RESPIRATION RATE: 16 BRPM | SYSTOLIC BLOOD PRESSURE: 106 MMHG | OXYGEN SATURATION: 97 % | HEIGHT: 68 IN | TEMPERATURE: 97.1 F | BODY MASS INDEX: 27.98 KG/M2

## 2024-06-22 DIAGNOSIS — R05.1 ACUTE COUGH: ICD-10-CM

## 2024-06-22 DIAGNOSIS — Z20.828 EXPOSURE TO INFLUENZA: ICD-10-CM

## 2024-06-22 LAB
FLUAV RNA SPEC QL NAA+PROBE: NEGATIVE
FLUBV RNA SPEC QL NAA+PROBE: NEGATIVE
RSV RNA SPEC QL NAA+PROBE: NEGATIVE
SARS-COV-2 RNA RESP QL NAA+PROBE: NEGATIVE

## 2024-06-22 PROCEDURE — 3074F SYST BP LT 130 MM HG: CPT

## 2024-06-22 PROCEDURE — 99213 OFFICE O/P EST LOW 20 MIN: CPT

## 2024-06-22 PROCEDURE — 0241U POCT CEPHEID COV-2, FLU A/B, RSV - PCR: CPT

## 2024-06-22 PROCEDURE — 3078F DIAST BP <80 MM HG: CPT

## 2024-06-22 RX ORDER — OSELTAMIVIR PHOSPHATE 75 MG/1
75 CAPSULE ORAL 2 TIMES DAILY
Qty: 10 CAPSULE | Refills: 0 | Status: SHIPPED | OUTPATIENT
Start: 2024-06-22 | End: 2024-06-27

## 2024-06-22 ASSESSMENT — ENCOUNTER SYMPTOMS
HEADACHES: 0
NAUSEA: 0
VOMITING: 0
EYE DISCHARGE: 0
COUGH: 1
BLURRED VISION: 0
SINUS PAIN: 0
DIZZINESS: 0
SPUTUM PRODUCTION: 0
NECK PAIN: 0
STRIDOR: 0
DIARRHEA: 0
DOUBLE VISION: 0
SHORTNESS OF BREATH: 0
PHOTOPHOBIA: 0
SORE THROAT: 0
EYE REDNESS: 0
WEAKNESS: 0
SENSORY CHANGE: 0
FEVER: 0
ABDOMINAL PAIN: 0
WHEEZING: 0
TINGLING: 0
EYE PAIN: 0
CHILLS: 0
MYALGIAS: 0

## 2024-06-22 ASSESSMENT — VISUAL ACUITY: OU: 1

## 2024-06-22 NOTE — PROGRESS NOTES
Subjective     Ethan Gerber is a 34 y.o. male who presents with cough and rhinorrhea x1 day.     HPI:   Ethan is a 35yo male presenting for cough and rhinorrhea x1 day. Reports recent close exposure to influenza A. Reports associated fatigue. Denies abdominal pain, vomiting, or diarrhea. No fever. Denies shortness of breath or increased work of breathing. Reports history of childhood asthma. Denies dysphagia or difficulty managing oral secretions. No rash. Denies facial or neck pain or swelling.        Review of Systems   Constitutional:  Positive for malaise/fatigue. Negative for chills and fever.   HENT:  Negative for congestion, ear discharge, ear pain, sinus pain and sore throat.    Eyes:  Negative for blurred vision, double vision, photophobia, pain, discharge and redness.   Respiratory:  Positive for cough. Negative for sputum production, shortness of breath, wheezing and stridor.    Gastrointestinal:  Negative for abdominal pain, diarrhea, nausea and vomiting.   Musculoskeletal:  Negative for myalgias and neck pain.   Skin:  Negative for rash.   Neurological:  Negative for dizziness, tingling, sensory change, weakness and headaches.     Past Medical History:   Diagnosis Date    Bloated abdomen 11/11/2019    Gastritis 12/23/2019    Kidney stone     Nephrolithiasis       Past Surgical History:   Procedure Laterality Date    LITHOTRIPSY Bilateral       Patient has no known allergies.     Current Outpatient Medications:     mesalamine (LIALDA) 1.2 GM Tablet Delayed Response, Take 1.2 mg by mouth 2 times a day. Taking 2 caps daily, Disp: , Rfl:      Social History     Tobacco Use    Smoking status: Never    Smokeless tobacco: Never   Vaping Use    Vaping status: Never Used   Substance Use Topics    Alcohol use: Never    Drug use: No      Family History   Problem Relation Age of Onset    Cancer Mother 44        lung cancer/ smoker    Bipolar disorder Mother     Multiple Sclerosis Mother     Bipolar  "disorder Sister     Other Sister         PCOS    Breast Cancer Paternal Grandmother       Medications, Allergies, and current problem list reviewed today in Epic.      Objective     /64 (BP Location: Right arm, Patient Position: Sitting, BP Cuff Size: Adult long)   Pulse 70   Temp 36.2 °C (97.1 °F) (Temporal)   Resp 16   Ht 1.727 m (5' 8\")   Wt 83.7 kg (184 lb 9.6 oz)   SpO2 97%   BMI 28.07 kg/m²      Physical Exam  Vitals reviewed.   Constitutional:       General: He is not in acute distress.  HENT:      Right Ear: Tympanic membrane, ear canal and external ear normal.      Left Ear: Tympanic membrane, ear canal and external ear normal.      Nose: Rhinorrhea present.      Mouth/Throat:      Mouth: Mucous membranes are moist.      Pharynx: Uvula midline. Posterior oropharyngeal erythema present. No oropharyngeal exudate or uvula swelling.   Eyes:      General: Vision grossly intact. Gaze aligned appropriately. No visual field deficit.     Extraocular Movements: Extraocular movements intact.      Conjunctiva/sclera: Conjunctivae normal.      Pupils: Pupils are equal, round, and reactive to light.   Neck:      Trachea: Trachea normal. No tracheal deviation.   Cardiovascular:      Rate and Rhythm: Normal rate and regular rhythm.      Pulses: Normal pulses.      Heart sounds: Normal heart sounds.   Pulmonary:      Effort: Pulmonary effort is normal. No tachypnea, accessory muscle usage, prolonged expiration, respiratory distress or retractions.      Breath sounds: Normal breath sounds. No stridor. No wheezing, rhonchi or rales.   Musculoskeletal:      Cervical back: Full passive range of motion without pain, normal range of motion and neck supple. No erythema, rigidity, tenderness or crepitus. No pain with movement. Normal range of motion.   Lymphadenopathy:      Cervical: Cervical adenopathy present.   Skin:     General: Skin is warm and dry.   Neurological:      Mental Status: He is alert. Mental status is " at baseline.   Psychiatric:         Mood and Affect: Mood normal.         Behavior: Behavior normal.         Thought Content: Thought content normal.       Results for orders placed or performed in visit on 06/22/24   POCT CoV-2, Flu A/B, RSV by PCR   Result Value Ref Range    SARS-CoV-2 by PCR Negative Negative, Invalid    Influenza virus A RNA Negative Negative, Invalid    Influenza virus B, PCR Negative Negative, Invalid    RSV, PCR Negative Negative, Invalid     Assessment & Plan     1. Acute cough   - POCT CoV-2, Flu A/B, RSV by PCR    2. Exposure to influenza   - oseltamivir (TAMIFLU) 75 MG Cap; Take 1 Capsule by mouth 2 times a day for 5 days.  Dispense: 10 Capsule; Refill: 0       MDM/Comments:   Patient with close exposure to influenza A through daughter. High likelihood of false negative test. Shared decision making with patient, will prophylactically treat with Oseltamivir. Symptoms began approximately 24 hours ago. Advised patient symptoms are viral in etiology, recommended supportive care. Increased fluids and rest. Patient opts for treatment with Oseltamivir. Risks vs benefit and potential side effects discussed.     Recommended over-the-counter cold and flu medications and Tylenol and/or ibuprofen for symptomatic relief and fevers.  Discussed use of nedi-pot, humidifier, and Flonase nasal spray as well.  Discussed good hand hygiene and ways to decrease spread of disease.  Follow-up with PCP return for reevaluation if symptoms persist/worsen. The patient demonstrated a good understanding and agreed with the treatment plan.    Vital signs stable in clinic.      Strict return and emergency room precautions given.      Differential diagnosis, natural history, supportive care, and indications for immediate follow-up discussed.       Follow-up as needed if symptoms worsen or fail to improve to PCP, Urgent care or Emergency Room.      Illness progression and alarm symptoms discussed with patient, emphasizing  low threshold for returning to clinic/emergency department for worsening symptoms. Patient is agreeable to the plan and verbalizes understanding, and will follow up if warranted.                         Electronically signed by AMELIE Cavazos

## 2024-08-05 ENCOUNTER — OFFICE VISIT (OUTPATIENT)
Dept: URGENT CARE | Facility: PHYSICIAN GROUP | Age: 34
End: 2024-08-05
Payer: COMMERCIAL

## 2024-08-05 VITALS
TEMPERATURE: 97.9 F | HEIGHT: 68 IN | WEIGHT: 194.45 LBS | DIASTOLIC BLOOD PRESSURE: 76 MMHG | RESPIRATION RATE: 17 BRPM | BODY MASS INDEX: 29.47 KG/M2 | SYSTOLIC BLOOD PRESSURE: 132 MMHG | HEART RATE: 70 BPM | OXYGEN SATURATION: 96 %

## 2024-08-05 DIAGNOSIS — H10.32 ACUTE CONJUNCTIVITIS OF LEFT EYE, UNSPECIFIED ACUTE CONJUNCTIVITIS TYPE: ICD-10-CM

## 2024-08-05 PROCEDURE — 99213 OFFICE O/P EST LOW 20 MIN: CPT

## 2024-08-05 PROCEDURE — 3075F SYST BP GE 130 - 139MM HG: CPT

## 2024-08-05 PROCEDURE — 3078F DIAST BP <80 MM HG: CPT

## 2024-08-05 RX ORDER — OFLOXACIN 3 MG/ML
1 SOLUTION/ DROPS OPHTHALMIC 4 TIMES DAILY
Qty: 5 ML | Refills: 0 | Status: SHIPPED | OUTPATIENT
Start: 2024-08-05 | End: 2024-08-05

## 2024-08-05 RX ORDER — OFLOXACIN 3 MG/ML
1 SOLUTION/ DROPS OPHTHALMIC 4 TIMES DAILY
Qty: 5 ML | Refills: 0 | Status: SHIPPED | OUTPATIENT
Start: 2024-08-05 | End: 2024-08-12

## 2024-08-05 ASSESSMENT — ENCOUNTER SYMPTOMS
FEVER: 0
CHILLS: 0

## 2024-08-05 NOTE — PROGRESS NOTES
Subjective:   Ethan Gerber is a very pleasant 34 y.o. male who presents for:    Chief Complaint   Patient presents with    Conjunctivitis     LT pink eye x2 days. Started worsening yesterday. Has a 16 month old at home who's been having sx of pink eye and was rx'ed amoxicillin. His 6 year old son also had pink eye and was rx'ed polytrim. Used the same eye drops and it aggravated his eyes.        HPI:    Conjunctivitis  Episode onset: two days ago, the patient developed conjuctival injection of the left eye after possibly being exposed to pink eye from his kids. Associated symptoms include congestion. Pertinent negatives include no chills or fever. Associated symptoms comments: Sinus congestion x 2 days. He is having a eye sensitivity and sharp pain. No purulent discharge, no trauma. Denies decrease in visual acuity. Polytrim x 3 times, which made the eye irritated.       ROS:    Review of Systems   Constitutional:  Negative for chills, fever and malaise/fatigue.   HENT:  Positive for congestion. Negative for ear discharge and ear pain.    All other systems reviewed and are negative.      Medications:      Current Outpatient Medications   Medication Sig    mesalamine (LIALDA) 1.2 GM Tablet Delayed Response Take 1.2 mg by mouth 2 times a day. Taking 2 caps daily       Allergies:     No Known Allergies    Problem List:     Patient Active Problem List   Diagnosis    Renal stones    Blood in stool    Ulcerative colitis (HCC)    Erosive esophagitis    Numbness and tingling       Surgical History:    Past Surgical History:   Procedure Laterality Date    LITHOTRIPSY Bilateral        Past Social Hx:     Social History     Socioeconomic History    Marital status:     Highest education level: Master's degree (e.g., MA, MS, Casandra, MEd, MSW, HERNAN)   Tobacco Use    Smoking status: Never    Smokeless tobacco: Never   Vaping Use    Vaping status: Never Used   Substance and Sexual Activity    Alcohol use: Never    Drug  use: No    Sexual activity: Yes     Partners: Female     Comment:       Social Determinants of Health     Financial Resource Strain: Low Risk  (11/3/2023)    Overall Financial Resource Strain (CARDIA)     Difficulty of Paying Living Expenses: Not hard at all   Food Insecurity: No Food Insecurity (11/3/2023)    Hunger Vital Sign     Worried About Running Out of Food in the Last Year: Never true     Ran Out of Food in the Last Year: Never true   Transportation Needs: No Transportation Needs (11/3/2023)    PRAPARE - Transportation     Lack of Transportation (Medical): No     Lack of Transportation (Non-Medical): No   Physical Activity: Insufficiently Active (11/3/2023)    Exercise Vital Sign     Days of Exercise per Week: 5 days     Minutes of Exercise per Session: 20 min   Stress: No Stress Concern Present (11/3/2023)    [employer] [place] [employer] - Occupational Stress Questionnaire     Feeling of Stress : Only a little   Social Connections: Socially Integrated (11/3/2023)    Social Connection and Isolation Panel [NHANES]     Frequency of Communication with Friends and Family: More than three times a week     Frequency of Social Gatherings with Friends and Family: More than three times a week     Attends Sabianist Services: More than 4 times per year     Active Member of Clubs or Organizations: Yes     Attends Club or Organization Meetings: More than 4 times per year     Marital Status:    Housing Stability: Unknown (11/3/2023)    Housing Stability Vital Sign     Unable to Pay for Housing in the Last Year: No     Unstable Housing in the Last Year: No        Past Family Hx:      Family History   Problem Relation Age of Onset    Cancer Mother 44        lung cancer/ smoker    Bipolar disorder Mother     Multiple Sclerosis Mother     Bipolar disorder Sister     Other Sister         PCOS    Breast Cancer Paternal Grandmother        Problem list, medications, and allergies reviewed by myself  today in Epic.     Objective:     Vitals:    08/05/24 1545   BP: 132/76   Pulse: 70   Resp: 17   Temp: 36.6 °C (97.9 °F)   SpO2: 96%       Physical Exam  Vitals reviewed.   Constitutional:       General: He is not in acute distress.     Appearance: Normal appearance. He is not ill-appearing, toxic-appearing or diaphoretic.   HENT:      Head: Normocephalic and atraumatic.      Right Ear: Tympanic membrane, ear canal and external ear normal.      Left Ear: Tympanic membrane, ear canal and external ear normal.      Nose: Congestion present.   Eyes:      General: Lids are normal. Lids are everted, no foreign bodies appreciated.      Extraocular Movements: Extraocular movements intact.      Conjunctiva/sclera:      Right eye: Right conjunctiva is not injected. No chemosis, exudate or hemorrhage.     Left eye: Left conjunctiva is injected. No chemosis, exudate or hemorrhage.     Pupils: Pupils are equal, round, and reactive to light.   Cardiovascular:      Pulses: Normal pulses.      Heart sounds: Normal heart sounds.   Pulmonary:      Effort: Pulmonary effort is normal.   Musculoskeletal:         General: Normal range of motion.      Cervical back: Normal range of motion.   Skin:     General: Skin is warm and dry.   Neurological:      General: No focal deficit present.      Mental Status: He is alert and oriented to person, place, and time. Mental status is at baseline.   Psychiatric:         Mood and Affect: Mood normal.         Behavior: Behavior normal.         Thought Content: Thought content normal.         Judgment: Judgment normal.       Vision Screening    Right eye Left eye Both eyes   Without correction 20/25 20/50 20/30   With correction         Assessment/Plan:     Diagnosis and associated orders:     1. Acute conjunctivitis of left eye, unspecified acute conjunctivitis type  - ofloxacin (OCUFLOX) 0.3 % Solution; Administer 1 Drop into the left eye 4 times a day for 7 days.  Dispense: 5 mL; Refill: 0           Comments/MDM:     At the time of physical exam, the patient has left-sided conjunctival injection.  No chemosis or exudate.  He has had close exposure to bacterial conjunctivitis.  Decreased visual acuity on the left eye, although the patient endorses that he has not had a regular eye checkup in at least 2 years.  Denies use of contact lenses.  Start ofloxacin gtt.  Advise close follow-up with optometry/ophthalmology if he has any changes in his visual acuity or his conjunctival injection worsens  May use longer acting antihistamine like Claritin x 10 days  May use cool compresses for eye swelling  Avoid touching eyes  Avoid wearing makeup until cleared  Monitor for increased eye swelling, vision change, eye pain- need re-evaluation          All questions answered. Patient verbalized understanding and is in agreement with this plan of care.     If symptoms are worsening or not improving in 3-5 days, follow-up with PCP or return to UC. Differential diagnosis, natural history, and supportive care discussed. AVS handout given and reviewed with patient. Patient educated on red flags and when to seek treatment back in ED or UC.     I personally reviewed prior external notes and test results pertinent to today's visit.  I have independently reviewed and interpreted all diagnostics ordered during this urgent care visit.     This dictation has been created using voice recognition software. The accuracy of the dictation is limited by the abilities of the software. I expect there may be some errors of grammar and possibly content. I made every attempt to manually correct the errors within my dictation. However, errors related to voice recognition software may still exist and should be interpreted within the appropriate context.    This note was electronically signed by LUIS Kelly

## 2024-08-20 ENCOUNTER — APPOINTMENT (OUTPATIENT)
Dept: MEDICAL GROUP | Facility: PHYSICIAN GROUP | Age: 34
End: 2024-08-20
Payer: COMMERCIAL

## 2024-09-30 ENCOUNTER — APPOINTMENT (OUTPATIENT)
Dept: MEDICAL GROUP | Facility: PHYSICIAN GROUP | Age: 34
End: 2024-09-30
Payer: COMMERCIAL

## 2024-09-30 VITALS
DIASTOLIC BLOOD PRESSURE: 74 MMHG | HEIGHT: 68 IN | TEMPERATURE: 98 F | OXYGEN SATURATION: 98 % | HEART RATE: 88 BPM | WEIGHT: 178 LBS | BODY MASS INDEX: 26.98 KG/M2 | SYSTOLIC BLOOD PRESSURE: 126 MMHG

## 2024-09-30 DIAGNOSIS — Z00.00 WELLNESS EXAMINATION: ICD-10-CM

## 2024-09-30 DIAGNOSIS — Z23 NEED FOR VACCINATION: ICD-10-CM

## 2024-09-30 SDOH — HEALTH STABILITY: PHYSICAL HEALTH: ON AVERAGE, HOW MANY DAYS PER WEEK DO YOU ENGAGE IN MODERATE TO STRENUOUS EXERCISE (LIKE A BRISK WALK)?: 4 DAYS

## 2024-09-30 SDOH — HEALTH STABILITY: PHYSICAL HEALTH: ON AVERAGE, HOW MANY MINUTES DO YOU ENGAGE IN EXERCISE AT THIS LEVEL?: 20 MIN

## 2024-09-30 SDOH — ECONOMIC STABILITY: FOOD INSECURITY: WITHIN THE PAST 12 MONTHS, THE FOOD YOU BOUGHT JUST DIDN'T LAST AND YOU DIDN'T HAVE MONEY TO GET MORE.: NEVER TRUE

## 2024-09-30 SDOH — ECONOMIC STABILITY: INCOME INSECURITY: HOW HARD IS IT FOR YOU TO PAY FOR THE VERY BASICS LIKE FOOD, HOUSING, MEDICAL CARE, AND HEATING?: NOT HARD AT ALL

## 2024-09-30 SDOH — ECONOMIC STABILITY: INCOME INSECURITY: IN THE LAST 12 MONTHS, WAS THERE A TIME WHEN YOU WERE NOT ABLE TO PAY THE MORTGAGE OR RENT ON TIME?: NO

## 2024-09-30 SDOH — ECONOMIC STABILITY: FOOD INSECURITY: WITHIN THE PAST 12 MONTHS, YOU WORRIED THAT YOUR FOOD WOULD RUN OUT BEFORE YOU GOT MONEY TO BUY MORE.: NEVER TRUE

## 2024-09-30 ASSESSMENT — LIFESTYLE VARIABLES
AUDIT-C TOTAL SCORE: 0
HOW OFTEN DO YOU HAVE A DRINK CONTAINING ALCOHOL: NEVER
HOW MANY STANDARD DRINKS CONTAINING ALCOHOL DO YOU HAVE ON A TYPICAL DAY: PATIENT DOES NOT DRINK
HOW OFTEN DO YOU HAVE SIX OR MORE DRINKS ON ONE OCCASION: NEVER
SKIP TO QUESTIONS 9-10: 1

## 2024-09-30 ASSESSMENT — SOCIAL DETERMINANTS OF HEALTH (SDOH)
HOW OFTEN DO YOU ATTEND CHURCH OR RELIGIOUS SERVICES?: MORE THAN 4 TIMES PER YEAR
IN A TYPICAL WEEK, HOW MANY TIMES DO YOU TALK ON THE PHONE WITH FAMILY, FRIENDS, OR NEIGHBORS?: MORE THAN THREE TIMES A WEEK
IN THE PAST 12 MONTHS, HAS THE ELECTRIC, GAS, OIL, OR WATER COMPANY THREATENED TO SHUT OFF SERVICE IN YOUR HOME?: NO
HOW OFTEN DO YOU GET TOGETHER WITH FRIENDS OR RELATIVES?: ONCE A WEEK
HOW MANY DRINKS CONTAINING ALCOHOL DO YOU HAVE ON A TYPICAL DAY WHEN YOU ARE DRINKING: PATIENT DOES NOT DRINK
HOW OFTEN DO YOU HAVE SIX OR MORE DRINKS ON ONE OCCASION: NEVER
HOW OFTEN DO YOU GET TOGETHER WITH FRIENDS OR RELATIVES?: ONCE A WEEK
HOW OFTEN DO YOU ATTENT MEETINGS OF THE CLUB OR ORGANIZATION YOU BELONG TO?: MORE THAN 4 TIMES PER YEAR
DO YOU BELONG TO ANY CLUBS OR ORGANIZATIONS SUCH AS CHURCH GROUPS UNIONS, FRATERNAL OR ATHLETIC GROUPS, OR SCHOOL GROUPS?: YES
HOW OFTEN DO YOU HAVE A DRINK CONTAINING ALCOHOL: NEVER
HOW OFTEN DO YOU ATTEND CHURCH OR RELIGIOUS SERVICES?: MORE THAN 4 TIMES PER YEAR
WITHIN THE PAST 12 MONTHS, YOU WORRIED THAT YOUR FOOD WOULD RUN OUT BEFORE YOU GOT THE MONEY TO BUY MORE: NEVER TRUE
HOW HARD IS IT FOR YOU TO PAY FOR THE VERY BASICS LIKE FOOD, HOUSING, MEDICAL CARE, AND HEATING?: NOT HARD AT ALL
IN A TYPICAL WEEK, HOW MANY TIMES DO YOU TALK ON THE PHONE WITH FAMILY, FRIENDS, OR NEIGHBORS?: MORE THAN THREE TIMES A WEEK
DO YOU BELONG TO ANY CLUBS OR ORGANIZATIONS SUCH AS CHURCH GROUPS UNIONS, FRATERNAL OR ATHLETIC GROUPS, OR SCHOOL GROUPS?: YES
HOW OFTEN DO YOU ATTENT MEETINGS OF THE CLUB OR ORGANIZATION YOU BELONG TO?: MORE THAN 4 TIMES PER YEAR

## 2024-09-30 ASSESSMENT — PATIENT HEALTH QUESTIONNAIRE - PHQ9: CLINICAL INTERPRETATION OF PHQ2 SCORE: 0

## 2024-09-30 NOTE — PROGRESS NOTES
Subjective:     CC:   Chief Complaint   Patient presents with    Annual Exam       HPI:   Ethan Gerber is a 34 y.o. male who presents for an annual exam. He is feeling well and has no complaints.    Health Maintenance  Advanced directive: N/A  PT for falls prevention: N/A  Cholesterol Screening: Will order today  Diabetes Screening: Will order today  AAA Screening: N/A  Aspirin Use: N/A    Anticipatory Guidance  Diet: Patient reports overall his diet is pretty well. He states he eats a lot of meat. He reports having protein bar in the morning. He reports he drinks a lot of water. He'll have a sandwich for lunch.  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   Seat belts, bike helmet, gun safety discussed.  Sun protection used.  Dental home.    Cancer screening  Colorectal Cancer Screening: Completed in 11/2023  Lung Cancer Screening: N/A  Prostate Cancer Screening/PSA: N/A    Infectious disease screening/Immunizations  --STI Screening: Declined   --Practices safe sex.  --HIV Screening: Completed in 7/2013 with negative findings  --Hepatitis C Screening: Completed in 7/2013 with negative findings  --Immunizations:    Influenza: Patient deferred today   HPV:  N/A   Tetanus:    Shingles: N/A    Pneumococcal : N/A   Hepatitis B: Completed  COVID-19: Recommended  Other immunizations: N/A    He  has a past medical history of Bloated abdomen (11/11/2019), Gastritis (12/23/2019), Kidney stone, and Nephrolithiasis.  He  has a past surgical history that includes lithotripsy (Bilateral).  Family History   Problem Relation Age of Onset    Cancer Mother 44        lung cancer/ smoker    Bipolar disorder Mother     Multiple Sclerosis Mother     Bipolar disorder Sister     Other Sister         PCOS    Breast Cancer Paternal Grandmother      Social History     Tobacco Use    Smoking status: Never    Smokeless tobacco: Never   Vaping Use    Vaping status:  "Never Used   Substance Use Topics    Alcohol use: Never    Drug use: No       Patient Active Problem List    Diagnosis Date Noted    Numbness and tingling 06/10/2021    Ulcerative colitis (HCC) 12/23/2019    Erosive esophagitis 12/23/2019    Blood in stool 09/30/2019    Renal stones 01/09/2019       Current Outpatient Medications   Medication Sig Dispense Refill    mesalamine (LIALDA) 1.2 GM Tablet Delayed Response Take 1.2 mg by mouth 2 times a day. Taking 2 caps daily       No current facility-administered medications for this visit.    (including changes today)  Allergies: Patient has no known allergies.    Review of Systems   Constitutional: Negative for fever, chills and malaise/fatigue.   HENT: Minimal congestion.    Eyes: Negative for pain.   Respiratory: Negative for  shortness of breath. Minimal cough.  Cardiovascular: Negative for leg swelling.   Gastrointestinal: Negative for nausea, vomiting, abdominal pain and diarrhea.   Genitourinary: Negative for dysuria and hematuria.   Skin: Negative for rash.   Neurological: Negative for dizziness, focal weakness and headaches.   Endo/Heme/Allergies: Does not bleed easily.   Psychiatric/Behavioral: Negative for depression. The patient is not nervous/anxious.      Objective:     /74 (BP Location: Left arm, Patient Position: Sitting, BP Cuff Size: Adult)   Pulse 88   Temp 36.7 °C (98 °F) (Temporal)   Ht 1.727 m (5' 8\")   Wt 80.7 kg (178 lb)   SpO2 98%   BMI 27.06 kg/m²   Body mass index is 27.06 kg/m².  Wt Readings from Last 4 Encounters:   09/30/24 80.7 kg (178 lb)   08/05/24 88.2 kg (194 lb 7.1 oz)   06/22/24 83.7 kg (184 lb 9.6 oz)   03/17/24 79.4 kg (175 lb)       Physical Exam:  Constitutional: Well-developed and well-nourished. Not diaphoretic. No distress.   Skin: Skin is warm and dry. No rash noted.  Head: Atraumatic without lesions.  Eyes: Conjunctivae and extraocular motions are normal. Pupils are equal, round, and reactive to light. No scleral " icterus.   Ears:  External ears unremarkable. Tympanic membranes clear and intact.  Nose: Nares patent. Septum midline. Turbinates without erythema nor edema. No discharge.   Mouth/Throat: Dentition is good. Tongue normal. Oropharynx is clear and moist. Posterior pharynx without erythema or exudates.  Neck: Supple, trachea midline. Normal range of motion. No thyromegaly present. No lymphadenopathy--cervical or supraclavicular.  Cardiovascular: Regular rate and rhythm, S1 and S2 without murmur, rubs, or gallops.    Lungs: Effort normal. Clear to auscultation throughout. No adventitious sounds. No CVA tenderness.  Abdomen: Soft, non tender, and without distention. Active bowel sounds in all four quadrants. No rebound, guarding, masses or HSM.  : Genitalia: Deferred  Rectal: deferred  Prostate: deferred  Extremities: No cyanosis, clubbing, erythema, nor edema. Distal pulses intact and symmetric.   Musculoskeletal: All major joints AROM full in all directions without pain.  Neurological: Alert and oriented x 3. DTRs 2+/3 and symmetric. No cranial nerve deficit. 5/5 myotomes. Sensation intact.  Psychiatric:  Behavior, mood, and affect are appropriate.      Assessment and Plan:     1. Wellness examination  Patient presents today for his annual preventive wellness visit.  Annual labs ordered.  Immunizations discussed.  Patient to receive influenza vaccination in the upcoming weeks.  Received Tdap today.  - Comp Metabolic Panel; Future  - Lipid Profile; Future  - CBC WITH DIFFERENTIAL; Future    2. Need for vaccination  - Tdap =>6yo IM      HCM: Completed.  Labs per orders.  Vaccinations per orders.  Counseling about diet, supplements, exercise, skin care and safe sex.        Follow-up: Return in about 1 year (around 9/30/2025) for Annual.